# Patient Record
Sex: MALE | Race: WHITE | URBAN - METROPOLITAN AREA
[De-identification: names, ages, dates, MRNs, and addresses within clinical notes are randomized per-mention and may not be internally consistent; named-entity substitution may affect disease eponyms.]

---

## 2018-06-07 ENCOUNTER — OFFICE (AMBULATORY)
Dept: URBAN - METROPOLITAN AREA PATHOLOGY 4 | Facility: PATHOLOGY | Age: 58
End: 2018-06-07
Payer: COMMERCIAL

## 2018-06-07 ENCOUNTER — ON CAMPUS - OUTPATIENT (AMBULATORY)
Dept: URBAN - METROPOLITAN AREA HOSPITAL 2 | Facility: HOSPITAL | Age: 58
End: 2018-06-07
Payer: COMMERCIAL

## 2018-06-07 VITALS
HEIGHT: 70 IN | HEART RATE: 67 BPM | SYSTOLIC BLOOD PRESSURE: 103 MMHG | OXYGEN SATURATION: 99 % | WEIGHT: 165.2 LBS | HEART RATE: 65 BPM | RESPIRATION RATE: 14 BRPM | SYSTOLIC BLOOD PRESSURE: 146 MMHG | DIASTOLIC BLOOD PRESSURE: 72 MMHG | HEART RATE: 63 BPM | DIASTOLIC BLOOD PRESSURE: 78 MMHG | HEART RATE: 64 BPM | SYSTOLIC BLOOD PRESSURE: 112 MMHG | DIASTOLIC BLOOD PRESSURE: 75 MMHG | SYSTOLIC BLOOD PRESSURE: 122 MMHG | SYSTOLIC BLOOD PRESSURE: 107 MMHG | OXYGEN SATURATION: 97 % | DIASTOLIC BLOOD PRESSURE: 68 MMHG | RESPIRATION RATE: 16 BRPM | OXYGEN SATURATION: 95 % | TEMPERATURE: 97.5 F | OXYGEN SATURATION: 98 % | HEART RATE: 77 BPM | SYSTOLIC BLOOD PRESSURE: 109 MMHG | HEART RATE: 69 BPM | DIASTOLIC BLOOD PRESSURE: 83 MMHG | HEART RATE: 66 BPM | DIASTOLIC BLOOD PRESSURE: 69 MMHG | HEART RATE: 72 BPM | SYSTOLIC BLOOD PRESSURE: 131 MMHG | DIASTOLIC BLOOD PRESSURE: 70 MMHG | SYSTOLIC BLOOD PRESSURE: 108 MMHG | SYSTOLIC BLOOD PRESSURE: 105 MMHG | DIASTOLIC BLOOD PRESSURE: 73 MMHG | HEART RATE: 70 BPM

## 2018-06-07 DIAGNOSIS — K63.5 POLYP OF COLON: ICD-10-CM

## 2018-06-07 DIAGNOSIS — K62.89 OTHER SPECIFIED DISEASES OF ANUS AND RECTUM: ICD-10-CM

## 2018-06-07 DIAGNOSIS — D12.4 BENIGN NEOPLASM OF DESCENDING COLON: ICD-10-CM

## 2018-06-07 DIAGNOSIS — K64.0 FIRST DEGREE HEMORRHOIDS: ICD-10-CM

## 2018-06-07 DIAGNOSIS — Z12.11 ENCOUNTER FOR SCREENING FOR MALIGNANT NEOPLASM OF COLON: ICD-10-CM

## 2018-06-07 DIAGNOSIS — D12.8 BENIGN NEOPLASM OF RECTUM: ICD-10-CM

## 2018-06-07 PROBLEM — K62.1 RECTAL POLYP: Status: ACTIVE | Noted: 2018-06-07

## 2018-06-07 PROBLEM — D12.5 BENIGN NEOPLASM OF SIGMOID COLON: Status: ACTIVE | Noted: 2018-06-07

## 2018-06-07 LAB
GI HISTOLOGY: A. UNSPECIFIED: (no result)
GI HISTOLOGY: B. UNSPECIFIED: (no result)
GI HISTOLOGY: C. UNSPECIFIED: (no result)
GI HISTOLOGY: PDF REPORT: (no result)

## 2018-06-07 PROCEDURE — 45385 COLONOSCOPY W/LESION REMOVAL: CPT | Mod: 33 | Performed by: INTERNAL MEDICINE

## 2018-06-07 PROCEDURE — 88305 TISSUE EXAM BY PATHOLOGIST: CPT | Mod: 33 | Performed by: INTERNAL MEDICINE

## 2018-06-07 RX ADMIN — PROPOFOL: 10 INJECTION, EMULSION INTRAVENOUS at 08:53

## 2023-12-06 NOTE — PROGRESS NOTES
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 Sykes for memory.  Patient retired about 1 1/2 ago and it started after that.  Patient is having trouble with dad to day things, like remembering conversations.  His father had Alzheimers. Father living 86. Paternal grandparents had dementia.   No dementia on mother side no dementia ,  of cancer.   Has one child, alive and well two grandchildren    No history of [facility]I,   General anesthesia, colonoscopy, teeth pulled , no major surgery    Not diabetic, no high blood pressure,     Had significant stress working at [employer]     B12, folate and tsh done at pcp office       Maximum   Score Patient's   Score Questions   5 1 \"What is the year?Season?Date?Day of the week?Month?\"   5 2 \"Where are we now: State?County?Town/city?Hospital?Floor?\"   3 3 3 Unrelated objects Number of trials:___   5 0 Count backward from 100 by sevens or spell WORLD backwards   3 0 Name 3 things from above   2 2 Identify 2 objects   1 0 Repeat the phrase: No ifs, ands,or buts.   3 2 Take paper in right hand, fold it in half, and put it on the floor.   1 1 Please read this and do what it says. \"Close your eyes\"   1 0 Make up and write a sentence about anything. Noun and verb   1 0 Copy this picture 10 angles must be present.   30 11 Total MMSE       Family History   Problem Relation Age of Onset    Cancer Mother     Alzheimer's disease Father        History reviewed. No pertinent past medical history.    Social History     Socioeconomic History    Marital status:    Tobacco Use    Smoking status: Every Day     Packs/day: 2.00     Years: 45.00     Additional pack years: 0.00     Total pack years: 90.00     Types: Cigarettes    Smokeless tobacco: Never   Substance and Sexual Activity    Alcohol use: Never    Drug use: Never    Sexual " "activity: Defer         Current Outpatient Medications:     amLODIPine (NORVASC) 10 MG tablet, Take 1 tablet by mouth Every Morning., Disp: , Rfl:     atorvastatin (LIPITOR) 10 MG tablet, Take 1 tablet by mouth Daily., Disp: , Rfl:     escitalopram (LEXAPRO) 5 MG tablet, Take 1 tablet by mouth Daily., Disp: , Rfl:     nebivolol (BYSTOLIC) 20 MG tablet, Take 1 tablet by mouth Daily., Disp: , Rfl:     triamterene-hydrochlorothiazide (MAXZIDE-25) 37.5-25 MG per tablet, Take 1 tablet by mouth Every Morning., Disp: , Rfl:     Review of Systems   Constitutional:  Negative for fatigue.   Respiratory:  Negative for apnea.    Neurological:  Negative for dizziness, tremors, seizures, syncope and headaches.   Psychiatric/Behavioral:  Negative for decreased concentration, hallucinations and sleep disturbance.    All other systems reviewed and are negative.           Objective   Vital Signs:   /69   Pulse 65   Ht 175.3 cm (69\")   Wt 85.3 kg (188 lb)   BMI 27.76 kg/m²     Physical Exam  Vitals reviewed.   HENT:      Nose: Nose normal.   Cardiovascular:      Rate and Rhythm: Normal rate and regular rhythm.   Pulmonary:      Effort: Pulmonary effort is normal. No respiratory distress.      Breath sounds: Normal breath sounds.   Neurological:      General: No focal deficit present.      Mental Status: He is alert and oriented to person, place, and time.   Psychiatric:         Mood and Affect: Mood normal.        Result Review :                Neurologic Exam     Mental Status   Oriented to person, place, and time.              Assessment and Plan    Diagnoses and all orders for this visit:    1. Memory loss (Primary)  -     MRI Brain With & Without Contrast; Future  -     Calcitriol (1,25 di-OH Vitamin D); Future  -     Ceruloplasmin; Future  -     Copper, Serum; Future  -     Vitamin E; Future  -     Vitamin B6; Future  -     T Pallidum Antibody (FTA-Ab); Future  -     Vitamin B1, Whole Blood    2. POLY (obstructive sleep " apnea)      Went to ENT, advanced ent, is going to give him a new machine  Have ordered a sleep study, first study was 2012    Do brain and physical exercise    Start Aricept. 5mg one month  then 10mg daily for one month then 10mg bid    Stop elavil amitriptyline as this can effect memory    Continue cpap       Follow Up   Return in about 1 year (around 12/7/2024).  Patient was given instructions and counseling regarding his condition or for health maintenance advice. Please see specific information pulled into the AVS if appropriate.         This document has been electronically signed by Joseph Seipel, MD on December 7, 2023 16:01 EST

## 2023-12-07 ENCOUNTER — OFFICE VISIT (OUTPATIENT)
Dept: NEUROLOGY | Facility: CLINIC | Age: 63
End: 2023-12-07
Payer: COMMERCIAL

## 2023-12-07 VITALS
SYSTOLIC BLOOD PRESSURE: 108 MMHG | WEIGHT: 188 LBS | DIASTOLIC BLOOD PRESSURE: 69 MMHG | HEART RATE: 65 BPM | BODY MASS INDEX: 27.85 KG/M2 | HEIGHT: 69 IN

## 2023-12-07 DIAGNOSIS — R41.3 MEMORY LOSS: Primary | ICD-10-CM

## 2023-12-07 DIAGNOSIS — G47.33 OSA (OBSTRUCTIVE SLEEP APNEA): ICD-10-CM

## 2023-12-07 PROCEDURE — 99204 OFFICE O/P NEW MOD 45 MIN: CPT | Performed by: PSYCHIATRY & NEUROLOGY

## 2023-12-07 RX ORDER — DONEPEZIL HYDROCHLORIDE 5 MG/1
5 TABLET, FILM COATED ORAL NIGHTLY
Qty: 90 TABLET | Refills: 1 | Status: SHIPPED | OUTPATIENT
Start: 2023-12-07 | End: 2024-12-06

## 2023-12-07 RX ORDER — AMLODIPINE BESYLATE 10 MG/1
10 TABLET ORAL EVERY MORNING
COMMUNITY
Start: 2023-10-04

## 2023-12-07 RX ORDER — NEBIVOLOL 20 MG/1
1 TABLET ORAL DAILY
COMMUNITY
Start: 2023-11-19

## 2023-12-07 RX ORDER — TRIAMTERENE AND HYDROCHLOROTHIAZIDE 37.5; 25 MG/1; MG/1
1 TABLET ORAL EVERY MORNING
COMMUNITY
Start: 2023-10-04

## 2023-12-07 RX ORDER — ATORVASTATIN CALCIUM 10 MG/1
1 TABLET, FILM COATED ORAL DAILY
COMMUNITY
Start: 2023-09-18

## 2023-12-07 RX ORDER — ESCITALOPRAM OXALATE 5 MG/1
1 TABLET ORAL DAILY
COMMUNITY
Start: 2023-11-20

## 2023-12-07 RX ORDER — AMITRIPTYLINE HYDROCHLORIDE 25 MG/1
25 TABLET, FILM COATED ORAL NIGHTLY
COMMUNITY
End: 2023-12-07

## 2023-12-08 ENCOUNTER — LAB (OUTPATIENT)
Dept: LAB | Facility: HOSPITAL | Age: 63
End: 2023-12-08
Payer: COMMERCIAL

## 2023-12-08 DIAGNOSIS — R41.3 MEMORY LOSS: ICD-10-CM

## 2023-12-08 LAB — CERULOPLASMIN SERPL-MCNC: 21 MG/DL (ref 16–31)

## 2023-12-08 PROCEDURE — 82390 ASSAY OF CERULOPLASMIN: CPT

## 2023-12-08 PROCEDURE — 84425 ASSAY OF VITAMIN B-1: CPT | Performed by: PSYCHIATRY & NEUROLOGY

## 2023-12-08 PROCEDURE — 84446 ASSAY OF VITAMIN E: CPT

## 2023-12-08 PROCEDURE — 82525 ASSAY OF COPPER: CPT

## 2023-12-08 PROCEDURE — 84207 ASSAY OF VITAMIN B-6: CPT

## 2023-12-08 PROCEDURE — 82652 VIT D 1 25-DIHYDROXY: CPT

## 2023-12-08 PROCEDURE — 36415 COLL VENOUS BLD VENIPUNCTURE: CPT

## 2023-12-08 PROCEDURE — 86780 TREPONEMA PALLIDUM: CPT

## 2023-12-11 LAB
1,25(OH)2D SERPL-MCNC: 27.3 PG/ML (ref 24.8–81.5)
T PALLIDUM AB SER QL IF: NON REACTIVE

## 2023-12-13 LAB
A-TOCOPHEROL VIT E SERPL-MCNC: 7.1 MG/L (ref 9–29)
GAMMA TOCOPHEROL SERPL-MCNC: 0.9 MG/L (ref 0.5–4.9)
PYRIDOXAL PHOS SERPL-MCNC: 2.9 UG/L (ref 3.4–65.2)
VIT B1 BLD-SCNC: 138.1 NMOL/L (ref 66.5–200)

## 2023-12-16 LAB — COPPER SERPL-MCNC: 106 UG/DL (ref 69–132)

## 2023-12-18 ENCOUNTER — TELEPHONE (OUTPATIENT)
Dept: NEUROLOGY | Facility: CLINIC | Age: 63
End: 2023-12-18
Payer: COMMERCIAL

## 2023-12-18 NOTE — TELEPHONE ENCOUNTER
----- Message from Joseph F Seipel, MD sent at 12/17/2023 12:53 PM EST -----  Labs ok but vitamin e and b6 are mildly low, take otc b6 and vit e for a month or so then take a multivitamin daily

## 2024-02-06 ENCOUNTER — HOSPITAL ENCOUNTER (OUTPATIENT)
Dept: MRI IMAGING | Facility: HOSPITAL | Age: 64
Discharge: HOME | End: 2024-02-06
Admitting: PSYCHIATRY & NEUROLOGY
Payer: COMMERCIAL

## 2024-02-06 DIAGNOSIS — R41.3 MEMORY LOSS: ICD-10-CM

## 2024-02-06 DIAGNOSIS — R90.89 ABNORMAL FINDING ON MRI OF BRAIN: Primary | ICD-10-CM

## 2024-02-06 LAB
CREAT BLDA-MCNC: 1.2 MG/DL (ref 0.6–1.3)
EGFRCR SERPLBLD CKD-EPI 2021: 67.5 ML/MIN/1.73

## 2024-02-06 PROCEDURE — A9579 GAD-BASE MR CONTRAST NOS,1ML: HCPCS | Performed by: PSYCHIATRY & NEUROLOGY

## 2024-02-06 PROCEDURE — 25010000002 GADOTERIDOL PER 1 ML: Performed by: PSYCHIATRY & NEUROLOGY

## 2024-02-06 PROCEDURE — 70553 MRI BRAIN STEM W/O & W/DYE: CPT

## 2024-02-06 PROCEDURE — 82565 ASSAY OF CREATININE: CPT

## 2024-02-06 RX ADMIN — GADOTERIDOL 14 ML: 279.3 INJECTION, SOLUTION INTRAVENOUS at 12:38

## 2024-02-07 ENCOUNTER — TELEPHONE (OUTPATIENT)
Dept: NEUROLOGY | Facility: CLINIC | Age: 64
End: 2024-02-07
Payer: COMMERCIAL

## 2024-02-07 NOTE — TELEPHONE ENCOUNTER
Spoke to patients wife about results and let her know that someone will call to schedule Echo and Doppler

## 2024-02-07 NOTE — TELEPHONE ENCOUNTER
----- Message from Joseph F Seipel, MD sent at 2/6/2024  1:50 PM EST -----  The mri shows ;  .Findings compatible with chronic microvascular ischemic change and diffuse cortical atrophy.  2.Focal encephalomalacia within the left parieto-occipital region.  The area of encephalomalacia is old, indicates an area of previous brain damage, may have been a stroke or due to head injury.  Continue to control blood pressure, and statin medication, take a 81mg asa daily   I will order a carotid doppler and an echo

## 2024-03-08 ENCOUNTER — HOSPITAL ENCOUNTER (OUTPATIENT)
Dept: CARDIOLOGY | Facility: HOSPITAL | Age: 64
Discharge: HOME | End: 2024-03-08
Payer: COMMERCIAL

## 2024-03-08 VITALS
BODY MASS INDEX: 27.85 KG/M2 | DIASTOLIC BLOOD PRESSURE: 69 MMHG | SYSTOLIC BLOOD PRESSURE: 110 MMHG | HEIGHT: 69 IN | WEIGHT: 188 LBS

## 2024-03-08 DIAGNOSIS — R90.89 ABNORMAL FINDING ON MRI OF BRAIN: ICD-10-CM

## 2024-03-08 DIAGNOSIS — R41.3 MEMORY LOSS: ICD-10-CM

## 2024-03-08 LAB
ASCENDING AORTA: 4 CM
BH CV ECHO LEFT VENTRICLE GLOBAL LONGITUDINAL STRAIN: -19.5 %
BH CV ECHO MEAS - AO MAX PG: 5.8 MMHG
BH CV ECHO MEAS - AO MEAN PG: 3 MMHG
BH CV ECHO MEAS - AO V2 MAX: 120 CM/SEC
BH CV ECHO MEAS - AO V2 VTI: 27.9 CM
BH CV ECHO MEAS - AVA(I,D): 2.45 CM2
BH CV ECHO MEAS - EDV(CUBED): 110.6 ML
BH CV ECHO MEAS - EDV(MOD-SP4): 107 ML
BH CV ECHO MEAS - EF(MOD-BP): 53 %
BH CV ECHO MEAS - EF(MOD-SP4): 52.8 %
BH CV ECHO MEAS - ESV(CUBED): 27 ML
BH CV ECHO MEAS - ESV(MOD-SP4): 50.5 ML
BH CV ECHO MEAS - FS: 37.5 %
BH CV ECHO MEAS - IVS/LVPW: 1 CM
BH CV ECHO MEAS - IVSD: 1 CM
BH CV ECHO MEAS - LA DIMENSION: 3.4 CM
BH CV ECHO MEAS - LAT PEAK E' VEL: 7.5 CM/SEC
BH CV ECHO MEAS - LV DIASTOLIC VOL/BSA (35-75): 53.2 CM2
BH CV ECHO MEAS - LV MASS(C)D: 170.2 GRAMS
BH CV ECHO MEAS - LV MAX PG: 3 MMHG
BH CV ECHO MEAS - LV MEAN PG: 1 MMHG
BH CV ECHO MEAS - LV SYSTOLIC VOL/BSA (12-30): 25.1 CM2
BH CV ECHO MEAS - LV V1 MAX: 87 CM/SEC
BH CV ECHO MEAS - LV V1 VTI: 19.7 CM
BH CV ECHO MEAS - LVIDD: 4.8 CM
BH CV ECHO MEAS - LVIDS: 3 CM
BH CV ECHO MEAS - LVOT AREA: 3.5 CM2
BH CV ECHO MEAS - LVOT DIAM: 2.1 CM
BH CV ECHO MEAS - LVPWD: 1 CM
BH CV ECHO MEAS - MED PEAK E' VEL: 7.9 CM/SEC
BH CV ECHO MEAS - MR MAX PG: 140.2 MMHG
BH CV ECHO MEAS - MR MAX VEL: 592 CM/SEC
BH CV ECHO MEAS - MV A MAX VEL: 68.2 CM/SEC
BH CV ECHO MEAS - MV DEC SLOPE: 253 CM/SEC2
BH CV ECHO MEAS - MV DEC TIME: 0.18 SEC
BH CV ECHO MEAS - MV E MAX VEL: 70 CM/SEC
BH CV ECHO MEAS - MV E/A: 1.03
BH CV ECHO MEAS - MV MAX PG: 3.7 MMHG
BH CV ECHO MEAS - MV MEAN PG: 2 MMHG
BH CV ECHO MEAS - MV P1/2T: 112.3 MSEC
BH CV ECHO MEAS - MV V2 VTI: 26.2 CM
BH CV ECHO MEAS - MVA(P1/2T): 1.96 CM2
BH CV ECHO MEAS - MVA(VTI): 2.6 CM2
BH CV ECHO MEAS - PA ACC TIME: 0.15 SEC
BH CV ECHO MEAS - PA V2 MAX: 78.4 CM/SEC
BH CV ECHO MEAS - RV MAX PG: 0.69 MMHG
BH CV ECHO MEAS - RV V1 MAX: 41.5 CM/SEC
BH CV ECHO MEAS - RV V1 VTI: 9.7 CM
BH CV ECHO MEAS - RVDD: 4.1 CM
BH CV ECHO MEAS - SI(MOD-SP4): 28.1 ML/M2
BH CV ECHO MEAS - SV(LVOT): 68.2 ML
BH CV ECHO MEAS - SV(MOD-SP4): 56.5 ML
BH CV ECHO MEAS - TAPSE (>1.6): 2.04 CM
BH CV ECHO MEAS - TR MAX PG: 22.5 MMHG
BH CV ECHO MEAS - TR MAX VEL: 237 CM/SEC
BH CV ECHO MEASUREMENTS AVERAGE E/E' RATIO: 9.09
BH CV ECHO SHUNT ASSESSMENT PERFORMED (HIDDEN SCRIPTING): 1
BH CV XLRA - TDI S': 11.9 CM/SEC
BH CV XLRA MEAS LEFT DIST CCA EDV: 16.7 CM/SEC
BH CV XLRA MEAS LEFT DIST CCA PSV: 46.7 CM/SEC
BH CV XLRA MEAS LEFT DIST ICA EDV: -21.2 CM/SEC
BH CV XLRA MEAS LEFT DIST ICA PSV: -65.1 CM/SEC
BH CV XLRA MEAS LEFT ICA/CCA RATIO: -0.97
BH CV XLRA MEAS LEFT PROX CCA EDV: 17.7 CM/SEC
BH CV XLRA MEAS LEFT PROX CCA PSV: 67.3 CM/SEC
BH CV XLRA MEAS LEFT PROX ECA PSV: -71.3 CM/SEC
BH CV XLRA MEAS LEFT PROX ICA EDV: -16.5 CM/SEC
BH CV XLRA MEAS LEFT PROX ICA PSV: -44.7 CM/SEC
BH CV XLRA MEAS LEFT PROX SCLA PSV: 97.4 CM/SEC
BH CV XLRA MEAS LEFT VERTEBRAL A PSV: -43.5 CM/SEC
BH CV XLRA MEAS RIGHT DIST CCA EDV: 17.4 CM/SEC
BH CV XLRA MEAS RIGHT DIST CCA PSV: 45.4 CM/SEC
BH CV XLRA MEAS RIGHT DIST ICA EDV: -17.2 CM/SEC
BH CV XLRA MEAS RIGHT DIST ICA PSV: -54.1 CM/SEC
BH CV XLRA MEAS RIGHT ICA/CCA RATIO: -0.94
BH CV XLRA MEAS RIGHT PROX CCA EDV: 14.9 CM/SEC
BH CV XLRA MEAS RIGHT PROX CCA PSV: 57.8 CM/SEC
BH CV XLRA MEAS RIGHT PROX ECA PSV: -59 CM/SEC
BH CV XLRA MEAS RIGHT PROX ICA EDV: -12.9 CM/SEC
BH CV XLRA MEAS RIGHT PROX ICA PSV: -41.9 CM/SEC
BH CV XLRA MEAS RIGHT PROX SCLA PSV: 105 CM/SEC
BH CV XLRA MEAS RIGHT VERTEBRAL A PSV: 40.4 CM/SEC
LEFT ATRIUM VOLUME INDEX: 22.9 ML/M2
SINUS: 3.5 CM
STJ: 3.3 CM

## 2024-03-08 PROCEDURE — 93880 EXTRACRANIAL BILAT STUDY: CPT

## 2024-03-08 PROCEDURE — 93306 TTE W/DOPPLER COMPLETE: CPT

## 2024-03-08 PROCEDURE — 93356 MYOCRD STRAIN IMG SPCKL TRCK: CPT

## 2024-04-10 DIAGNOSIS — R41.3 MEMORY LOSS: ICD-10-CM

## 2024-04-10 RX ORDER — DONEPEZIL HYDROCHLORIDE 5 MG/1
5 TABLET, FILM COATED ORAL
Qty: 90 TABLET | Refills: 1 | Status: SHIPPED | OUTPATIENT
Start: 2024-04-10

## 2024-05-17 ENCOUNTER — APPOINTMENT (OUTPATIENT)
Dept: CT IMAGING | Facility: HOSPITAL | Age: 64
End: 2024-05-17
Payer: COMMERCIAL

## 2024-05-17 ENCOUNTER — APPOINTMENT (OUTPATIENT)
Dept: MRI IMAGING | Facility: HOSPITAL | Age: 64
End: 2024-05-17
Payer: COMMERCIAL

## 2024-05-17 ENCOUNTER — APPOINTMENT (OUTPATIENT)
Dept: GENERAL RADIOLOGY | Facility: HOSPITAL | Age: 64
End: 2024-05-17
Payer: COMMERCIAL

## 2024-05-17 ENCOUNTER — HOSPITAL ENCOUNTER (OUTPATIENT)
Facility: HOSPITAL | Age: 64
Setting detail: OBSERVATION
Discharge: HOME | End: 2024-05-19
Attending: EMERGENCY MEDICINE | Admitting: HOSPITALIST
Payer: COMMERCIAL

## 2024-05-17 ENCOUNTER — TELEPHONE (OUTPATIENT)
Dept: NEUROLOGY | Facility: CLINIC | Age: 64
End: 2024-05-17
Payer: COMMERCIAL

## 2024-05-17 DIAGNOSIS — R93.1 ABNORMAL ECHOCARDIOGRAM: ICD-10-CM

## 2024-05-17 DIAGNOSIS — R90.89 ABNORMAL FINDING ON MRI OF BRAIN: Primary | ICD-10-CM

## 2024-05-17 DIAGNOSIS — R41.0 CONFUSION: Primary | ICD-10-CM

## 2024-05-17 PROBLEM — R47.01 APHASIA: Status: ACTIVE | Noted: 2024-05-17

## 2024-05-17 LAB
ABO GROUP BLD: NORMAL
ALBUMIN SERPL-MCNC: 4.4 G/DL (ref 3.5–5.2)
ALBUMIN/GLOB SERPL: 1.6 G/DL
ALP SERPL-CCNC: 111 U/L (ref 39–117)
ALT SERPL W P-5'-P-CCNC: 22 U/L (ref 1–41)
ANION GAP SERPL CALCULATED.3IONS-SCNC: 12 MMOL/L (ref 5–15)
APTT PPP: 30.3 SECONDS (ref 24–31)
AST SERPL-CCNC: 18 U/L (ref 1–40)
BASOPHILS # BLD AUTO: 0.07 10*3/MM3 (ref 0–0.2)
BASOPHILS NFR BLD AUTO: 0.9 % (ref 0–1.5)
BILIRUB SERPL-MCNC: 0.6 MG/DL (ref 0–1.2)
BLD GP AB SCN SERPL QL: NEGATIVE
BUN SERPL-MCNC: 16 MG/DL (ref 8–23)
BUN/CREAT SERPL: 16.5 (ref 7–25)
CALCIUM SPEC-SCNC: 9.1 MG/DL (ref 8.6–10.5)
CHLORIDE SERPL-SCNC: 106 MMOL/L (ref 98–107)
CHOLEST SERPL-MCNC: 106 MG/DL (ref 0–200)
CO2 SERPL-SCNC: 25 MMOL/L (ref 22–29)
CREAT SERPL-MCNC: 0.97 MG/DL (ref 0.76–1.27)
DEPRECATED RDW RBC AUTO: 43.8 FL (ref 37–54)
EGFRCR SERPLBLD CKD-EPI 2021: 87.2 ML/MIN/1.73
EOSINOPHIL # BLD AUTO: 0.28 10*3/MM3 (ref 0–0.4)
EOSINOPHIL NFR BLD AUTO: 3.5 % (ref 0.3–6.2)
ERYTHROCYTE [DISTWIDTH] IN BLOOD BY AUTOMATED COUNT: 12.1 % (ref 12.3–15.4)
GLOBULIN UR ELPH-MCNC: 2.8 GM/DL
GLUCOSE BLDC GLUCOMTR-MCNC: 120 MG/DL (ref 70–105)
GLUCOSE SERPL-MCNC: 112 MG/DL (ref 65–99)
HBA1C MFR BLD: 6.3 % (ref 4.8–5.6)
HCT VFR BLD AUTO: 51.1 % (ref 37.5–51)
HDLC SERPL-MCNC: 33 MG/DL (ref 40–60)
HGB BLD-MCNC: 17.9 G/DL (ref 13–17.7)
HOLD SPECIMEN: NORMAL
HOLD SPECIMEN: NORMAL
IMM GRANULOCYTES # BLD AUTO: 0.01 10*3/MM3 (ref 0–0.05)
IMM GRANULOCYTES NFR BLD AUTO: 0.1 % (ref 0–0.5)
INR PPP: 1 (ref 0.93–1.1)
LDLC SERPL CALC-MCNC: 41 MG/DL (ref 0–100)
LDLC/HDLC SERPL: 1.02 {RATIO}
LYMPHOCYTES # BLD AUTO: 2.61 10*3/MM3 (ref 0.7–3.1)
LYMPHOCYTES NFR BLD AUTO: 32.7 % (ref 19.6–45.3)
MCH RBC QN AUTO: 34 PG (ref 26.6–33)
MCHC RBC AUTO-ENTMCNC: 35 G/DL (ref 31.5–35.7)
MCV RBC AUTO: 97.1 FL (ref 79–97)
MONOCYTES # BLD AUTO: 0.68 10*3/MM3 (ref 0.1–0.9)
MONOCYTES NFR BLD AUTO: 8.5 % (ref 5–12)
NEUTROPHILS NFR BLD AUTO: 4.34 10*3/MM3 (ref 1.7–7)
NEUTROPHILS NFR BLD AUTO: 54.3 % (ref 42.7–76)
NRBC BLD AUTO-RTO: 0 /100 WBC (ref 0–0.2)
PLATELET # BLD AUTO: 223 10*3/MM3 (ref 140–450)
PMV BLD AUTO: 10.6 FL (ref 6–12)
POTASSIUM SERPL-SCNC: 3.7 MMOL/L (ref 3.5–5.2)
PROT SERPL-MCNC: 7.2 G/DL (ref 6–8.5)
PROTHROMBIN TIME: 10.9 SECONDS (ref 9.6–11.7)
RBC # BLD AUTO: 5.26 10*6/MM3 (ref 4.14–5.8)
RH BLD: POSITIVE
SODIUM SERPL-SCNC: 143 MMOL/L (ref 136–145)
T&S EXPIRATION DATE: NORMAL
TRIGL SERPL-MCNC: 196 MG/DL (ref 0–150)
TROPONIN T SERPL HS-MCNC: <6 NG/L
TSH SERPL DL<=0.05 MIU/L-ACNC: 1.52 UIU/ML (ref 0.27–4.2)
VLDLC SERPL-MCNC: 32 MG/DL (ref 5–40)
WBC NRBC COR # BLD AUTO: 7.99 10*3/MM3 (ref 3.4–10.8)
WHOLE BLOOD HOLD COAG: NORMAL
WHOLE BLOOD HOLD SPECIMEN: NORMAL

## 2024-05-17 PROCEDURE — G0378 HOSPITAL OBSERVATION PER HR: HCPCS

## 2024-05-17 PROCEDURE — 25810000003 SODIUM CHLORIDE 0.9 % SOLUTION: Performed by: EMERGENCY MEDICINE

## 2024-05-17 PROCEDURE — 25510000001 IOPAMIDOL PER 1 ML: Performed by: EMERGENCY MEDICINE

## 2024-05-17 PROCEDURE — 93005 ELECTROCARDIOGRAM TRACING: CPT | Performed by: EMERGENCY MEDICINE

## 2024-05-17 PROCEDURE — 99285 EMERGENCY DEPT VISIT HI MDM: CPT

## 2024-05-17 PROCEDURE — 84484 ASSAY OF TROPONIN QUANT: CPT | Performed by: EMERGENCY MEDICINE

## 2024-05-17 PROCEDURE — 70450 CT HEAD/BRAIN W/O DYE: CPT

## 2024-05-17 PROCEDURE — 70498 CT ANGIOGRAPHY NECK: CPT

## 2024-05-17 PROCEDURE — 86850 RBC ANTIBODY SCREEN: CPT | Performed by: EMERGENCY MEDICINE

## 2024-05-17 PROCEDURE — 86900 BLOOD TYPING SEROLOGIC ABO: CPT

## 2024-05-17 PROCEDURE — 84443 ASSAY THYROID STIM HORMONE: CPT | Performed by: INTERNAL MEDICINE

## 2024-05-17 PROCEDURE — 85730 THROMBOPLASTIN TIME PARTIAL: CPT | Performed by: EMERGENCY MEDICINE

## 2024-05-17 PROCEDURE — 71045 X-RAY EXAM CHEST 1 VIEW: CPT

## 2024-05-17 PROCEDURE — 82948 REAGENT STRIP/BLOOD GLUCOSE: CPT

## 2024-05-17 PROCEDURE — 80061 LIPID PANEL: CPT | Performed by: INTERNAL MEDICINE

## 2024-05-17 PROCEDURE — 83036 HEMOGLOBIN GLYCOSYLATED A1C: CPT | Performed by: INTERNAL MEDICINE

## 2024-05-17 PROCEDURE — 96372 THER/PROPH/DIAG INJ SC/IM: CPT

## 2024-05-17 PROCEDURE — 86901 BLOOD TYPING SEROLOGIC RH(D): CPT

## 2024-05-17 PROCEDURE — 86900 BLOOD TYPING SEROLOGIC ABO: CPT | Performed by: EMERGENCY MEDICINE

## 2024-05-17 PROCEDURE — 70551 MRI BRAIN STEM W/O DYE: CPT

## 2024-05-17 PROCEDURE — 85610 PROTHROMBIN TIME: CPT | Performed by: EMERGENCY MEDICINE

## 2024-05-17 PROCEDURE — 25010000002 OLANZAPINE 10 MG RECONSTITUTED SOLUTION: Performed by: INTERNAL MEDICINE

## 2024-05-17 PROCEDURE — 99214 OFFICE O/P EST MOD 30 MIN: CPT | Performed by: STUDENT IN AN ORGANIZED HEALTH CARE EDUCATION/TRAINING PROGRAM

## 2024-05-17 PROCEDURE — 70496 CT ANGIOGRAPHY HEAD: CPT

## 2024-05-17 PROCEDURE — 85025 COMPLETE CBC W/AUTO DIFF WBC: CPT | Performed by: EMERGENCY MEDICINE

## 2024-05-17 PROCEDURE — 36415 COLL VENOUS BLD VENIPUNCTURE: CPT

## 2024-05-17 PROCEDURE — 86901 BLOOD TYPING SEROLOGIC RH(D): CPT | Performed by: EMERGENCY MEDICINE

## 2024-05-17 PROCEDURE — 80053 COMPREHEN METABOLIC PANEL: CPT | Performed by: EMERGENCY MEDICINE

## 2024-05-17 RX ORDER — SODIUM CHLORIDE 9 MG/ML
40 INJECTION, SOLUTION INTRAVENOUS AS NEEDED
Status: DISCONTINUED | OUTPATIENT
Start: 2024-05-17 | End: 2024-05-19 | Stop reason: HOSPADM

## 2024-05-17 RX ORDER — NITROGLYCERIN 0.4 MG/1
0.4 TABLET SUBLINGUAL
Status: DISCONTINUED | OUTPATIENT
Start: 2024-05-17 | End: 2024-05-19 | Stop reason: HOSPADM

## 2024-05-17 RX ORDER — UREA 10 %
5 LOTION (ML) TOPICAL NIGHTLY PRN
Status: DISCONTINUED | OUTPATIENT
Start: 2024-05-17 | End: 2024-05-19 | Stop reason: HOSPADM

## 2024-05-17 RX ORDER — OLANZAPINE 10 MG/2ML
5 INJECTION, POWDER, FOR SOLUTION INTRAMUSCULAR ONCE
Status: DISCONTINUED | OUTPATIENT
Start: 2024-05-17 | End: 2024-05-17

## 2024-05-17 RX ORDER — DONEPEZIL HYDROCHLORIDE 5 MG/1
5 TABLET, FILM COATED ORAL NIGHTLY
Status: DISCONTINUED | OUTPATIENT
Start: 2024-05-17 | End: 2024-05-19 | Stop reason: HOSPADM

## 2024-05-17 RX ORDER — MULTIPLE VITAMINS W/ MINERALS TAB 9MG-400MCG
1 TAB ORAL DAILY
COMMUNITY

## 2024-05-17 RX ORDER — AMLODIPINE BESYLATE 5 MG/1
10 TABLET ORAL EVERY MORNING
Status: DISCONTINUED | OUTPATIENT
Start: 2024-05-18 | End: 2024-05-19 | Stop reason: HOSPADM

## 2024-05-17 RX ORDER — ATORVASTATIN CALCIUM 40 MG/1
40 TABLET, FILM COATED ORAL NIGHTLY
Status: DISCONTINUED | OUTPATIENT
Start: 2024-05-17 | End: 2024-05-19 | Stop reason: HOSPADM

## 2024-05-17 RX ORDER — SODIUM CHLORIDE 0.9 % (FLUSH) 0.9 %
10 SYRINGE (ML) INJECTION EVERY 12 HOURS SCHEDULED
Status: DISCONTINUED | OUTPATIENT
Start: 2024-05-17 | End: 2024-05-19 | Stop reason: HOSPADM

## 2024-05-17 RX ORDER — ONDANSETRON 2 MG/ML
4 INJECTION INTRAMUSCULAR; INTRAVENOUS EVERY 6 HOURS PRN
Status: DISCONTINUED | OUTPATIENT
Start: 2024-05-17 | End: 2024-05-19 | Stop reason: HOSPADM

## 2024-05-17 RX ORDER — SODIUM CHLORIDE 0.9 % (FLUSH) 0.9 %
10 SYRINGE (ML) INJECTION AS NEEDED
Status: DISCONTINUED | OUTPATIENT
Start: 2024-05-17 | End: 2024-05-19 | Stop reason: HOSPADM

## 2024-05-17 RX ORDER — ASPIRIN 325 MG
325 TABLET ORAL ONCE
Status: DISCONTINUED | OUTPATIENT
Start: 2024-05-17 | End: 2024-05-17

## 2024-05-17 RX ORDER — ATORVASTATIN CALCIUM 10 MG/1
10 TABLET, FILM COATED ORAL NIGHTLY
Status: DISCONTINUED | OUTPATIENT
Start: 2024-05-17 | End: 2024-05-17

## 2024-05-17 RX ORDER — ALUMINA, MAGNESIA, AND SIMETHICONE 2400; 2400; 240 MG/30ML; MG/30ML; MG/30ML
15 SUSPENSION ORAL EVERY 6 HOURS PRN
Status: DISCONTINUED | OUTPATIENT
Start: 2024-05-17 | End: 2024-05-19 | Stop reason: HOSPADM

## 2024-05-17 RX ORDER — BISACODYL 10 MG
10 SUPPOSITORY, RECTAL RECTAL DAILY PRN
Status: DISCONTINUED | OUTPATIENT
Start: 2024-05-17 | End: 2024-05-19 | Stop reason: HOSPADM

## 2024-05-17 RX ORDER — ACETAMINOPHEN 325 MG/1
650 TABLET ORAL EVERY 4 HOURS PRN
Status: DISCONTINUED | OUTPATIENT
Start: 2024-05-17 | End: 2024-05-19 | Stop reason: HOSPADM

## 2024-05-17 RX ORDER — OLANZAPINE 10 MG/2ML
5 INJECTION, POWDER, FOR SOLUTION INTRAMUSCULAR ONCE AS NEEDED
Status: COMPLETED | OUTPATIENT
Start: 2024-05-17 | End: 2024-05-17

## 2024-05-17 RX ORDER — ASPIRIN 300 MG/1
300 SUPPOSITORY RECTAL ONCE
Status: DISCONTINUED | OUTPATIENT
Start: 2024-05-17 | End: 2024-05-17

## 2024-05-17 RX ORDER — ASPIRIN 81 MG/1
81 TABLET ORAL DAILY
Status: DISCONTINUED | OUTPATIENT
Start: 2024-05-17 | End: 2024-05-17

## 2024-05-17 RX ORDER — NEBIVOLOL 10 MG/1
20 TABLET ORAL NIGHTLY
Status: DISCONTINUED | OUTPATIENT
Start: 2024-05-17 | End: 2024-05-19 | Stop reason: HOSPADM

## 2024-05-17 RX ORDER — CLOPIDOGREL BISULFATE 75 MG/1
75 TABLET ORAL DAILY
Status: DISCONTINUED | OUTPATIENT
Start: 2024-05-17 | End: 2024-05-19 | Stop reason: HOSPADM

## 2024-05-17 RX ORDER — AMOXICILLIN 250 MG
2 CAPSULE ORAL 2 TIMES DAILY PRN
Status: DISCONTINUED | OUTPATIENT
Start: 2024-05-17 | End: 2024-05-19 | Stop reason: HOSPADM

## 2024-05-17 RX ORDER — ASPIRIN 81 MG/1
81 TABLET ORAL DAILY
Status: DISCONTINUED | OUTPATIENT
Start: 2024-05-18 | End: 2024-05-19 | Stop reason: HOSPADM

## 2024-05-17 RX ORDER — ONDANSETRON 4 MG/1
4 TABLET, ORALLY DISINTEGRATING ORAL EVERY 6 HOURS PRN
Status: DISCONTINUED | OUTPATIENT
Start: 2024-05-17 | End: 2024-05-19 | Stop reason: HOSPADM

## 2024-05-17 RX ORDER — BISACODYL 5 MG/1
5 TABLET, DELAYED RELEASE ORAL DAILY PRN
Status: DISCONTINUED | OUTPATIENT
Start: 2024-05-17 | End: 2024-05-19 | Stop reason: HOSPADM

## 2024-05-17 RX ORDER — POLYETHYLENE GLYCOL 3350 17 G/17G
17 POWDER, FOR SOLUTION ORAL DAILY PRN
Status: DISCONTINUED | OUTPATIENT
Start: 2024-05-17 | End: 2024-05-19 | Stop reason: HOSPADM

## 2024-05-17 RX ORDER — ASPIRIN 81 MG/1
81 TABLET ORAL DAILY
COMMUNITY

## 2024-05-17 RX ADMIN — DONEPEZIL HYDROCHLORIDE 5 MG: 5 TABLET, FILM COATED ORAL at 22:20

## 2024-05-17 RX ADMIN — ATORVASTATIN CALCIUM 40 MG: 40 TABLET, FILM COATED ORAL at 22:20

## 2024-05-17 RX ADMIN — Medication 10 ML: at 22:20

## 2024-05-17 RX ADMIN — SODIUM CHLORIDE 500 ML: 9 INJECTION, SOLUTION INTRAVENOUS at 16:51

## 2024-05-17 RX ADMIN — IOPAMIDOL 100 ML: 755 INJECTION, SOLUTION INTRAVENOUS at 16:31

## 2024-05-17 RX ADMIN — OLANZAPINE 5 MG: 10 INJECTION, POWDER, FOR SOLUTION INTRAMUSCULAR at 18:58

## 2024-05-17 NOTE — H&P
History and Physical   Navid Stearns : 1960 MRN:[id number] LOS:0     Reason for admission: Aphasia     Assessment / Plan     #Aphasia along with altered mental status, CVA to exclude  -Presented with dizziness altered mental status aphasia around 2 PM on May 17, 2024.  Lasted for 30 mins   -NIHSS 1 in the ED. He is back to baseline.   -CT head and CT angio head and neck without any acute changes.  -MRI brain and echo  -Neurology is consulted.  -ASA and statin   -Fall precaution seizure precaution PT OT CM    #Aortic issue ( ? Aneurysm )  -pt family mentioned there is large aorta and seems has to be managed asap, they got cardio appt   -they requested neurologist to consult cardio   -nowhere to find the result as of now   -will get CT angio chest and if needed will get CTS    #Hypertension  -Resume home medication once appropriate.  Given CVA exclusion, permissive hypertension as of now.    # Hyperlipidemia  -On statin.  Lipid panel    #Mild dementia  -Frequent reorientation          Nutrition: NPO Diet NPO Type: Strict NPO     DVT Prophylaxis:   Mechanical Order History:        Ordered        24 1723  Place Sequential Compression Device  Once            24 1723  Maintain Sequential Compression Device  Continuous                          Pharmalogical Order History:       None             History of Present illness     A 64 y.o. old male patient with PMH of hypertension hyperlipidemia CVA mild dementia presents to the hospital with complaints of altered mental status with aphasia around 2 PM today. Dizziness present. Lasting for 30 mins only. NIHSS 1 in the ED. CT head CT angio head and neck without any acute changes.  MRI brain pending.  Echo to be done.  Neurology consulted. Another issue is he is recently getting told that he got the large aorta needed cardio attention. Not sure the result. There is no result in epic as well. Cardio is consulted.     Admitted for CVA and aorta issue  workup.     Subjective / Review of systems     Review of Systems   Feeling fine     Past Medical/Surgical/Social/Family History & Allergies   No past medical history on file.   No past surgical history on file.   Social History     Socioeconomic History    Marital status:    Tobacco Use    Smoking status: Every Day     Current packs/day: 2.00     Average packs/day: 2.0 packs/day for 45.0 years (90.0 ttl pk-yrs)     Types: Cigarettes    Smokeless tobacco: Never   Substance and Sexual Activity    Alcohol use: Never    Drug use: Never    Sexual activity: Defer      Family History   Problem Relation Age of Onset    Cancer Mother     Alzheimer's disease Father       Allergies   Allergen Reactions    Penicillins Unknown - High Severity        Home Medications     Prior to Admission medications    Medication Sig Start Date End Date Taking? Authorizing Provider   amLODIPine (NORVASC) 10 MG tablet Take 1 tablet by mouth Every Morning. 10/4/23  Yes Nicolás Mcconnell MD   aspirin 81 MG EC tablet Take 1 tablet by mouth Daily.   Yes Nicolás Mcconnell MD   atorvastatin (LIPITOR) 10 MG tablet Take 1 tablet by mouth Every Night. 9/18/23  Yes Nicolás Mcconnell MD   donepezil (ARICEPT) 5 MG tablet TAKE 1 TABLET BY MOUTH EVERY DAY AT NIGHT 4/10/24  Yes Seipel, Joseph F, MD   escitalopram (LEXAPRO) 5 MG tablet Take 1 tablet by mouth Every Night. 11/20/23  Yes Nicolás Mcconnell MD   multivitamin with minerals tablet tablet Take 1 tablet by mouth Daily.   Yes Nicolás Mcconnell MD   nebivolol (BYSTOLIC) 20 MG tablet Take 1 tablet by mouth Every Night. 11/19/23  Yes Nicolás Mcconnell MD   triamterene-hydrochlorothiazide (MAXZIDE-25) 37.5-25 MG per tablet Take 1 tablet by mouth Every Morning. 10/4/23 5/17/24  Nicolás Mcconnell MD      Objective / Physical Exam   Vital signs:  Temp: 98.6 °F (37 °C)  BP: 143/68  Heart Rate: 63  Resp: 17  SpO2: 97 %  Weight: 75.8 kg (167 lb)    Admission Weight: Weight: 75.8  kg (167 lb)    Physical Exam   Physical Exam  HENT:      Head: Normocephalic and atraumatic.      Nose: Nose normal.   Eyes:      Extraocular Movements: Extraocular movements intact.      Conjunctivae/sclera: Conjunctivae normal.      Pupils: Pupils are equal, round, and reactive to light.   Cardiovascular:      Rate and Rhythm: normal       Pulses: Normal pulses.      Heart sounds: Normal heart sounds.   Pulmonary:      Effort: normal      Breath sounds: normal   Abdominal:      General: Abdomen is flat. Bowel sounds are normal.      Palpations: Abdomen is soft.   Musculoskeletal:         General: Normal range of motion.      Cervical back: Normal range of motion and neck supple.   Skin:     General: Skin is dry.   Neurological:      General: No focal deficit present.      Mental Status: alert.   Psychiatric:         Mood and Affect: Mood normal.        Labs     Results from last 7 days   Lab Units 05/17/24  1616   WBC 10*3/mm3 7.99   HEMATOCRIT % 51.1*   PLATELETS 10*3/mm3 223      Results from last 7 days   Lab Units 05/17/24  1616   SODIUM mmol/L 143   POTASSIUM mmol/L 3.7   CHLORIDE mmol/L 106   CO2 mmol/L 25.0   BUN mg/dL 16   CREATININE mg/dL 0.97        Current Medications   Scheduled Meds:[Held by provider] amLODIPine, 10 mg, Oral, QAM  aspirin, 81 mg, Oral, Daily  atorvastatin, 10 mg, Oral, Nightly  donepezil, 5 mg, Oral, Nightly  [Held by provider] nebivolol, 20 mg, Oral, Nightly  sodium chloride, 10 mL, Intravenous, Q12H         Continuous Infusions:      Jag Mosher MD  [facility]   05/17/24   17:25 EDT

## 2024-05-17 NOTE — CASE MANAGEMENT/SOCIAL WORK
Discharge Planning Assessment   Oscar     Patient Name: Navid Stearns  MRN: [id number]  Today's Date: 5/17/2024    Admit Date: 5/17/2024    Plan: From home, PT/OT pending   Discharge Needs Assessment       Row Name 05/17/24 1929       Living Environment    People in Home spouse    Current Living Arrangements home    Potentially Unsafe Housing Conditions none    In the past 12 months has the electric, gas, oil, or water company threatened to shut off services in your home? No    Primary Care Provided by self    Provides Primary Care For no one    Family Caregiver if Needed spouse    Family Caregiver Names Spouse Rosalind    Quality of Family Relationships helpful;involved;supportive    Able to Return to Prior Arrangements yes       Resource/Environmental Concerns    Resource/Environmental Concerns none    Transportation Concerns none       Transportation Needs    In the past 12 months, has lack of transportation kept you from medical appointments or from getting medications? no    In the past 12 months, has lack of transportation kept you from meetings, work, or from getting things needed for daily living? No       Food Insecurity    Within the past 12 months, you worried that your food would run out before you got the money to buy more. Never true    Within the past 12 months, the food you bought just didn't last and you didn't have money to get more. Never true       Transition Planning    Patient/Family Anticipates Transition to home with family    Patient/Family Anticipated Services at Transition none    Transportation Anticipated family or friend will provide  spouse Rosalind can transport at d/c.       Discharge Needs Assessment    Readmission Within the Last 30 Days no previous admission in last 30 days    Equipment Currently Used at Home cpap;bp cuff;other (see comments)  CPAP per [facility] [facility], Sat monitor    Concerns to be Addressed no discharge needs identified                   Discharge Plan       Yesica  Name 05/17/24 1935       Plan    Plan From home, PT/OT pending    Patient/Family in Agreement with Plan yes    Provided Post Acute Provider List? N/A    Provided Post Acute Provider Quality & Resource List? N/A    Plan Comments CM spoke with pt. and spouse Rosalind at bedside. PCP and pharmacy confirmed. pt. denies financial, transportation, or medication issues. pt declined M2B. Pt.'s spouse Rosalind can transport at d/c. DC Barriers: PT/OT pending, Neurology and Cardiology consult.                Demographic Summary       Row Name 05/17/24 1928       General Information    Admission Type inpatient    Referral Source admission list    Reason for Consult discharge planning       Contact Information    Permission Granted to Share Info With     Contact Information Obtained for                    Functional Status       Row Name 05/17/24 1929       Functional Status    Usual Activity Tolerance excellent    Current Activity Tolerance good       Physical Activity    On average, how many days per week do you engage in moderate to strenuous exercise (like a brisk walk)? 7 days    On average, how many minutes do you engage in exercise at this level? 150+ min    Number of minutes of exercise per week 1050       Functional Status, IADL    Medications independent    Meal Preparation independent    Housekeeping independent    Shopping independent                     Belle Trevizo RN    Office: [phone]  Fax: [phone]  [email or web address]

## 2024-05-17 NOTE — ED PROVIDER NOTES
"Subjective   History of Present Illness  64-year-old male was with his wife \"running errands \"and around 2 PM wife noticed that he seemed to be somewhat confused he was having some difficulty speaking and was describing being dizzy.  She states this lasted about 30 minutes.  She states upon arrival here wife and  both state that he seems to be back to his baseline.  He denies any focal numbness or weakness or headache or blurry vision.  Review of Systems    No past medical history on file.  Hypertension, hypercholesterolemia, stroke seen on neuroimaging 1 year ago, mild dementia  Allergies   Allergen Reactions    Penicillins Unknown - High Severity       No past surgical history on file.    Family History   Problem Relation Age of Onset    Cancer Mother     Alzheimer's disease Father        Social History     Socioeconomic History    Marital status:    Tobacco Use    Smoking status: Every Day     Current packs/day: 2.00     Average packs/day: 2.0 packs/day for 45.0 years (90.0 ttl pk-yrs)     Types: Cigarettes    Smokeless tobacco: Never   Substance and Sexual Activity    Alcohol use: Never    Drug use: Never    Sexual activity: Defer     Prior to Admission medications    Medication Sig Start Date End Date Taking? Authorizing Provider   amLODIPine (NORVASC) 10 MG tablet Take 1 tablet by mouth Every Morning. 10/4/23   Nicolás Mcconnell MD   atorvastatin (LIPITOR) 10 MG tablet Take 1 tablet by mouth Daily. 9/18/23   Nicolás Mcconnell MD   donepezil (ARICEPT) 5 MG tablet TAKE 1 TABLET BY MOUTH EVERY DAY AT NIGHT 4/10/24   Seipel, Joseph F, MD   escitalopram (LEXAPRO) 5 MG tablet Take 1 tablet by mouth Daily. 11/20/23   Nicolás Mcconnell MD   nebivolol (BYSTOLIC) 20 MG tablet Take 1 tablet by mouth Daily. 11/19/23   Nicolás Mcconnell MD   triamterene-hydrochlorothiazide (MAXZIDE-25) 37.5-25 MG per tablet Take 1 tablet by mouth Every Morning. 10/4/23   Nicolás Mcconnell MD     /68 " "(BP Location: Right arm, Patient Position: Sitting)   Pulse 63   Temp 98.6 °F (37 °C) (Oral)   Resp 17   Ht 175.3 cm (69\")   Wt 75.8 kg (167 lb)   SpO2 97%   BMI 24.66 kg/m²         Objective   Physical Exam  General: Well-developed well-appearing, no acute distress, alert and appropriate  Eyes: Pupils round and equal, sclera nonicteric  HEENT: Mucous membranes moist, no mucosal swelling  Neck: Supple, no nuchal rigidity, no JVD  Respirations: Respirations nonlabored, equal breath sounds bilaterally, clear lungs  Heart regular rate and rhythm, no murmurs rubs or gallops,   Abdomen soft nontender nondistended, no hepatosplenomegaly,   Extremities no clubbing cyanosis or edema,   Neuro cranial nerves II through XII intact , normal sensory/motor function and strength in four extremities, no slurred speech, no facial droop, normal finger to nose, normal gait and station, no ataxia  Psych oriented, pleasant affect  Skin no rash, brisk cap refill  Procedures           ED Course      Results for orders placed or performed during the hospital encounter of 05/17/24   Comprehensive Metabolic Panel    Specimen: Blood   Result Value Ref Range    Glucose 112 (H) 65 - 99 mg/dL    BUN 16 8 - 23 mg/dL    Creatinine 0.97 0.76 - 1.27 mg/dL    Sodium 143 136 - 145 mmol/L    Potassium 3.7 3.5 - 5.2 mmol/L    Chloride 106 98 - 107 mmol/L    CO2 25.0 22.0 - 29.0 mmol/L    Calcium 9.1 8.6 - 10.5 mg/dL    Total Protein 7.2 6.0 - 8.5 g/dL    Albumin 4.4 3.5 - 5.2 g/dL    ALT (SGPT) 22 1 - 41 U/L    AST (SGOT) 18 1 - 40 U/L    Alkaline Phosphatase 111 39 - 117 U/L    Total Bilirubin 0.6 0.0 - 1.2 mg/dL    Globulin 2.8 gm/dL    A/G Ratio 1.6 g/dL    BUN/Creatinine Ratio 16.5 7.0 - 25.0    Anion Gap 12.0 5.0 - 15.0 mmol/L    eGFR 87.2 >60.0 mL/min/1.73   Protime-INR    Specimen: Blood   Result Value Ref Range    Protime 10.9 9.6 - 11.7 Seconds    INR 1.00 0.93 - 1.10   aPTT    Specimen: Blood   Result Value Ref Range    PTT 30.3 24.0 - " 31.0 seconds   Single High Sensitivity Troponin T    Specimen: Blood   Result Value Ref Range    HS Troponin T <6 <22 ng/L   CBC Auto Differential    Specimen: Blood   Result Value Ref Range    WBC 7.99 3.40 - 10.80 10*3/mm3    RBC 5.26 4.14 - 5.80 10*6/mm3    Hemoglobin 17.9 (H) 13.0 - 17.7 g/dL    Hematocrit 51.1 (H) 37.5 - 51.0 %    MCV 97.1 (H) 79.0 - 97.0 fL    MCH 34.0 (H) 26.6 - 33.0 pg    MCHC 35.0 31.5 - 35.7 g/dL    RDW 12.1 (L) 12.3 - 15.4 %    RDW-SD 43.8 37.0 - 54.0 fl    MPV 10.6 6.0 - 12.0 fL    Platelets 223 140 - 450 10*3/mm3    Neutrophil % 54.3 42.7 - 76.0 %    Lymphocyte % 32.7 19.6 - 45.3 %    Monocyte % 8.5 5.0 - 12.0 %    Eosinophil % 3.5 0.3 - 6.2 %    Basophil % 0.9 0.0 - 1.5 %    Immature Grans % 0.1 0.0 - 0.5 %    Neutrophils, Absolute 4.34 1.70 - 7.00 10*3/mm3    Lymphocytes, Absolute 2.61 0.70 - 3.10 10*3/mm3    Monocytes, Absolute 0.68 0.10 - 0.90 10*3/mm3    Eosinophils, Absolute 0.28 0.00 - 0.40 10*3/mm3    Basophils, Absolute 0.07 0.00 - 0.20 10*3/mm3    Immature Grans, Absolute 0.01 0.00 - 0.05 10*3/mm3    nRBC 0.0 0.0 - 0.2 /100 WBC   POC Glucose Once    Specimen: Blood   Result Value Ref Range    Glucose 120 (H) 70 - 105 mg/dL   ECG 12 Lead Stroke Evaluation   Result Value Ref Range    QT Interval 418 ms    QTC Interval 426 ms   Green Top (Gel)   Result Value Ref Range    Extra Tube Hold for add-ons.    Lavender Top   Result Value Ref Range    Extra Tube hold for add-on    Gold Top - SST   Result Value Ref Range    Extra Tube Hold for add-ons.    Light Blue Top   Result Value Ref Range    Extra Tube Hold for add-ons.      XR Chest 1 View    Result Date: 5/17/2024  Impression: No acute cardiopulmonary abnormality. Electronically Signed: Jonathan Bonner MD  5/17/2024 5:13 PM EDT  Workstation ID: [id number]    CT Angiogram Head w AI Analysis of LVO    Result Date: 5/17/2024  Mild atheromatous disease. No vessel occlusion or severe stenosis. Electronically Signed: Tyrone Blank MD   5/17/2024 4:44 PM EDT  Workstation ID: [id number]    CT Angiogram Neck    Result Date: 5/17/2024  Mild atheromatous disease. No vessel occlusion or severe stenosis. Electronically Signed: Tyrone Blank MD  5/17/2024 4:44 PM EDT  Workstation ID: [id number]    CT Head Without Contrast Stroke Protocol    Result Date: 5/17/2024  1. No acute intracranial hemorrhage or mass effect 2. Remote infarct, area of encephalomalacia again noted left parietal occipital region Electronically Signed: Lamont Turk MD  5/17/2024 4:25 PM EDT  Workstation ID: [id number]                       Total (NIH Stroke Scale): 1                  Medical Decision Making  Code stroke was initiated upon patient's initial evaluation brought back from triage.  Notably I do not feel like he is a thrombolytic candidate based on his resolution of symptoms and NIH of 0 at this time.  Differential diagnosis including CVA, TIA, seizure, metabolic encephalopathy, hypertensive encephalopathy      Patient remained neurologically stable during the emergency room course.  With description that he is back to his baseline mental status.  He was evaluated by neurology in consultation.  MRI of the brain has been ordered for further evaluation.  He will be admitted to [facility] for further neurologic evaluation.  Case discussed with  with the hospitalist service who is agreeable plan of admission.  Patient and family advised of findings and agreeable to plan of admission.      Amount and/or Complexity of Data Reviewed  Labs: ordered.     Details: CBC essentially normal, some borderline polycythemia; comprehensive metabolic panel normal, coags normal, high-sensitivity troponin normal  Radiology: ordered and independent interpretation performed.     Details: My independent interpretation of CT head image no apparent acute hemorrhage, old stroke; chest x-ray no apparent acute process  ECG/medicine tests: ordered and independent interpretation performed.     Details: My EKG  interpretation sinus rhythm rate of 62, no acute ST or T wave abnormality    Risk  Prescription drug management.        Final diagnoses:   Confusion       ED Disposition  ED Disposition       ED Disposition   Decision to Admit    Condition   --    Comment   Level of Care: Telemetry [5]   Admitting Physician: MONTANA BERUMEN [[id number]]   Attending Physician: MONTANA BERUMEN [[id number]]   Bed Request Comments: veronica                 No follow-up provider specified.       Medication List      No changes were made to your prescriptions during this visit.            Dony Pastor MD  05/17/24 [id number]

## 2024-05-17 NOTE — Clinical Note
Level of Care: Med/Surg [1]   Diagnosis: Aphasia [784.3.ICD-9-CM]   Admitting Physician: MONTANA BERUMEN [[id number]]   Attending Physician: MONTANA BERUMEN [[id number]]   Certification: I Certify That Inpatient Hospital Services Are Medically Necessary For Greater Than 2 Midnights

## 2024-05-17 NOTE — TELEPHONE ENCOUNTER
Pt and wife came into office asking about Echo and doppler results from testing Dr. Seipel had ordered but they did not get results. I spoke with Dr. Seipel and he confirmed echo was abnormal, doppler was normal but he would need to see cardiology. I spoke with patient and wife regarding results and was able to call cardiology and get him in 5/22/24. Spoke with wife and they are aware of appt. Can you put in referral?

## 2024-05-18 ENCOUNTER — APPOINTMENT (OUTPATIENT)
Dept: CARDIOLOGY | Facility: HOSPITAL | Age: 64
End: 2024-05-18
Payer: COMMERCIAL

## 2024-05-18 ENCOUNTER — APPOINTMENT (OUTPATIENT)
Dept: CT IMAGING | Facility: HOSPITAL | Age: 64
End: 2024-05-18
Payer: COMMERCIAL

## 2024-05-18 LAB
ANION GAP SERPL CALCULATED.3IONS-SCNC: 9 MMOL/L (ref 5–15)
BASOPHILS # BLD AUTO: 0.07 10*3/MM3 (ref 0–0.2)
BASOPHILS NFR BLD AUTO: 0.8 % (ref 0–1.5)
BH CV ECHO LEFT VENTRICLE GLOBAL LONGITUDINAL STRAIN: -19.1 %
BH CV ECHO MEAS - ACS: 1.8 CM
BH CV ECHO MEAS - AO MAX PG: 4.7 MMHG
BH CV ECHO MEAS - AO MEAN PG: 2 MMHG
BH CV ECHO MEAS - AO ROOT DIAM: 4 CM
BH CV ECHO MEAS - AO V2 MAX: 108 CM/SEC
BH CV ECHO MEAS - AO V2 VTI: 26.3 CM
BH CV ECHO MEAS - AVA(I,D): 3.1 CM2
BH CV ECHO MEAS - EDV(CUBED): 125 ML
BH CV ECHO MEAS - EDV(MOD-SP2): 125 ML
BH CV ECHO MEAS - EDV(MOD-SP4): 123 ML
BH CV ECHO MEAS - EF(MOD-BP): 59 %
BH CV ECHO MEAS - EF(MOD-SP2): 61.4 %
BH CV ECHO MEAS - EF(MOD-SP4): 62.4 %
BH CV ECHO MEAS - ESV(CUBED): 32.8 ML
BH CV ECHO MEAS - ESV(MOD-SP2): 48.2 ML
BH CV ECHO MEAS - ESV(MOD-SP4): 46.2 ML
BH CV ECHO MEAS - FS: 36 %
BH CV ECHO MEAS - IVS/LVPW: 1 CM
BH CV ECHO MEAS - IVSD: 1 CM
BH CV ECHO MEAS - LA DIMENSION: 3.9 CM
BH CV ECHO MEAS - LAT PEAK E' VEL: 10.9 CM/SEC
BH CV ECHO MEAS - LV DIASTOLIC VOL/BSA (35-75): 64.3 CM2
BH CV ECHO MEAS - LV MASS(C)D: 182 GRAMS
BH CV ECHO MEAS - LV MAX PG: 4.6 MMHG
BH CV ECHO MEAS - LV MEAN PG: 2 MMHG
BH CV ECHO MEAS - LV SYSTOLIC VOL/BSA (12-30): 24.1 CM2
BH CV ECHO MEAS - LV V1 MAX: 107 CM/SEC
BH CV ECHO MEAS - LV V1 VTI: 21.6 CM
BH CV ECHO MEAS - LVIDD: 5 CM
BH CV ECHO MEAS - LVIDS: 3.2 CM
BH CV ECHO MEAS - LVOT AREA: 3.8 CM2
BH CV ECHO MEAS - LVOT DIAM: 2.2 CM
BH CV ECHO MEAS - LVPWD: 1 CM
BH CV ECHO MEAS - MED PEAK E' VEL: 7.8 CM/SEC
BH CV ECHO MEAS - MR MAX PG: 52.4 MMHG
BH CV ECHO MEAS - MR MAX VEL: 362 CM/SEC
BH CV ECHO MEAS - MV A MAX VEL: 60.3 CM/SEC
BH CV ECHO MEAS - MV DEC SLOPE: 326 CM/SEC2
BH CV ECHO MEAS - MV DEC TIME: 0.23 SEC
BH CV ECHO MEAS - MV E MAX VEL: 57.6 CM/SEC
BH CV ECHO MEAS - MV E/A: 0.96
BH CV ECHO MEAS - MV MAX PG: 2.31 MMHG
BH CV ECHO MEAS - MV MEAN PG: 1 MMHG
BH CV ECHO MEAS - MV P1/2T: 69.5 MSEC
BH CV ECHO MEAS - MV V2 VTI: 29 CM
BH CV ECHO MEAS - MVA(P1/2T): 3.2 CM2
BH CV ECHO MEAS - MVA(VTI): 2.8 CM2
BH CV ECHO MEAS - PA V2 MAX: 89.6 CM/SEC
BH CV ECHO MEAS - PULM A REVS DUR: 0.16 SEC
BH CV ECHO MEAS - PULM A REVS VEL: 33.6 CM/SEC
BH CV ECHO MEAS - PULM DIAS VEL: 44.5 CM/SEC
BH CV ECHO MEAS - PULM S/D: 1.1
BH CV ECHO MEAS - PULM SYS VEL: 48.8 CM/SEC
BH CV ECHO MEAS - RAP SYSTOLE: 3 MMHG
BH CV ECHO MEAS - RVDD: 3 CM
BH CV ECHO MEAS - RVOT DIAM: 3 CM
BH CV ECHO MEAS - RVSP: 34 MMHG
BH CV ECHO MEAS - SV(LVOT): 82.1 ML
BH CV ECHO MEAS - SV(MOD-SP2): 76.8 ML
BH CV ECHO MEAS - SV(MOD-SP4): 76.8 ML
BH CV ECHO MEAS - SVI(LVOT): 42.9 ML/M2
BH CV ECHO MEAS - SVI(MOD-SP2): 40.1 ML/M2
BH CV ECHO MEAS - SVI(MOD-SP4): 40.1 ML/M2
BH CV ECHO MEAS - TAPSE (>1.6): 2.11 CM
BH CV ECHO MEAS - TR MAX PG: 30.9 MMHG
BH CV ECHO MEAS - TR MAX VEL: 278 CM/SEC
BH CV ECHO MEASUREMENTS AVERAGE E/E' RATIO: 6.16
BH CV XLRA - RV BASE: 3.1 CM
BH CV XLRA - RV LENGTH: 7.5 CM
BH CV XLRA - RV MID: 2 CM
BH CV XLRA - TDI S': 12 CM/SEC
BUN SERPL-MCNC: 15 MG/DL (ref 8–23)
BUN/CREAT SERPL: 17.9 (ref 7–25)
CALCIUM SPEC-SCNC: 9 MG/DL (ref 8.6–10.5)
CHLORIDE SERPL-SCNC: 111 MMOL/L (ref 98–107)
CO2 SERPL-SCNC: 25 MMOL/L (ref 22–29)
CREAT SERPL-MCNC: 0.84 MG/DL (ref 0.76–1.27)
DEPRECATED RDW RBC AUTO: 44.1 FL (ref 37–54)
EGFRCR SERPLBLD CKD-EPI 2021: 97.4 ML/MIN/1.73
EOSINOPHIL # BLD AUTO: 0.4 10*3/MM3 (ref 0–0.4)
EOSINOPHIL NFR BLD AUTO: 4.8 % (ref 0.3–6.2)
ERYTHROCYTE [DISTWIDTH] IN BLOOD BY AUTOMATED COUNT: 12.3 % (ref 12.3–15.4)
GLUCOSE SERPL-MCNC: 88 MG/DL (ref 65–99)
HCT VFR BLD AUTO: 46.9 % (ref 37.5–51)
HGB BLD-MCNC: 16 G/DL (ref 13–17.7)
IMM GRANULOCYTES # BLD AUTO: 0.01 10*3/MM3 (ref 0–0.05)
IMM GRANULOCYTES NFR BLD AUTO: 0.1 % (ref 0–0.5)
LEFT ATRIUM VOLUME INDEX: 22.9 ML/M2
LYMPHOCYTES # BLD AUTO: 3.25 10*3/MM3 (ref 0.7–3.1)
LYMPHOCYTES NFR BLD AUTO: 38.6 % (ref 19.6–45.3)
MAGNESIUM SERPL-MCNC: 2.3 MG/DL (ref 1.6–2.4)
MCH RBC QN AUTO: 33.1 PG (ref 26.6–33)
MCHC RBC AUTO-ENTMCNC: 34.1 G/DL (ref 31.5–35.7)
MCV RBC AUTO: 97.1 FL (ref 79–97)
MONOCYTES # BLD AUTO: 0.6 10*3/MM3 (ref 0.1–0.9)
MONOCYTES NFR BLD AUTO: 7.1 % (ref 5–12)
NEUTROPHILS NFR BLD AUTO: 4.09 10*3/MM3 (ref 1.7–7)
NEUTROPHILS NFR BLD AUTO: 48.6 % (ref 42.7–76)
NRBC BLD AUTO-RTO: 0 /100 WBC (ref 0–0.2)
PHOSPHATE SERPL-MCNC: 3.3 MG/DL (ref 2.5–4.5)
PLATELET # BLD AUTO: 192 10*3/MM3 (ref 140–450)
PMV BLD AUTO: 10.6 FL (ref 6–12)
POTASSIUM SERPL-SCNC: 3.3 MMOL/L (ref 3.5–5.2)
POTASSIUM SERPL-SCNC: 4 MMOL/L (ref 3.5–5.2)
QT INTERVAL: 418 MS
QTC INTERVAL: 426 MS
RBC # BLD AUTO: 4.83 10*6/MM3 (ref 4.14–5.8)
SINUS: 4 CM
SODIUM SERPL-SCNC: 145 MMOL/L (ref 136–145)
STJ: 2.8 CM
WBC NRBC COR # BLD AUTO: 8.42 10*3/MM3 (ref 3.4–10.8)

## 2024-05-18 PROCEDURE — 80048 BASIC METABOLIC PNL TOTAL CA: CPT | Performed by: INTERNAL MEDICINE

## 2024-05-18 PROCEDURE — G0378 HOSPITAL OBSERVATION PER HR: HCPCS

## 2024-05-18 PROCEDURE — 93306 TTE W/DOPPLER COMPLETE: CPT | Performed by: INTERNAL MEDICINE

## 2024-05-18 PROCEDURE — 99214 OFFICE O/P EST MOD 30 MIN: CPT | Performed by: STUDENT IN AN ORGANIZED HEALTH CARE EDUCATION/TRAINING PROGRAM

## 2024-05-18 PROCEDURE — 84100 ASSAY OF PHOSPHORUS: CPT | Performed by: INTERNAL MEDICINE

## 2024-05-18 PROCEDURE — 71275 CT ANGIOGRAPHY CHEST: CPT

## 2024-05-18 PROCEDURE — 83735 ASSAY OF MAGNESIUM: CPT | Performed by: INTERNAL MEDICINE

## 2024-05-18 PROCEDURE — 93356 MYOCRD STRAIN IMG SPCKL TRCK: CPT

## 2024-05-18 PROCEDURE — 85025 COMPLETE CBC W/AUTO DIFF WBC: CPT | Performed by: INTERNAL MEDICINE

## 2024-05-18 PROCEDURE — 25510000001 IOPAMIDOL PER 1 ML: Performed by: HOSPITALIST

## 2024-05-18 PROCEDURE — 93306 TTE W/DOPPLER COMPLETE: CPT

## 2024-05-18 PROCEDURE — 84132 ASSAY OF SERUM POTASSIUM: CPT | Performed by: HOSPITALIST

## 2024-05-18 PROCEDURE — 93356 MYOCRD STRAIN IMG SPCKL TRCK: CPT | Performed by: INTERNAL MEDICINE

## 2024-05-18 RX ORDER — POTASSIUM CHLORIDE 20 MEQ/1
40 TABLET, EXTENDED RELEASE ORAL EVERY 4 HOURS
Status: COMPLETED | OUTPATIENT
Start: 2024-05-18 | End: 2024-05-18

## 2024-05-18 RX ORDER — RISPERIDONE 0.5 MG/1
0.5 TABLET, ORALLY DISINTEGRATING ORAL ONCE AS NEEDED
Status: COMPLETED | OUTPATIENT
Start: 2024-05-18 | End: 2024-05-18

## 2024-05-18 RX ORDER — NICOTINE 21 MG/24HR
1 PATCH, TRANSDERMAL 24 HOURS TRANSDERMAL NIGHTLY
Status: DISCONTINUED | OUTPATIENT
Start: 2024-05-18 | End: 2024-05-19 | Stop reason: HOSPADM

## 2024-05-18 RX ADMIN — Medication 10 ML: at 20:36

## 2024-05-18 RX ADMIN — DONEPEZIL HYDROCHLORIDE 5 MG: 5 TABLET, FILM COATED ORAL at 20:35

## 2024-05-18 RX ADMIN — ASPIRIN 81 MG: 81 TABLET, COATED ORAL at 08:59

## 2024-05-18 RX ADMIN — Medication 1 PATCH: at 20:35

## 2024-05-18 RX ADMIN — ATORVASTATIN CALCIUM 40 MG: 40 TABLET, FILM COATED ORAL at 20:35

## 2024-05-18 RX ADMIN — RISPERIDONE 0.5 MG: 0.5 TABLET, ORALLY DISINTEGRATING ORAL at 20:35

## 2024-05-18 RX ADMIN — Medication 10 ML: at 09:05

## 2024-05-18 RX ADMIN — Medication 5 MG: at 20:35

## 2024-05-18 RX ADMIN — POTASSIUM CHLORIDE 40 MEQ: 1500 TABLET, EXTENDED RELEASE ORAL at 12:20

## 2024-05-18 RX ADMIN — POTASSIUM CHLORIDE 40 MEQ: 1500 TABLET, EXTENDED RELEASE ORAL at 08:59

## 2024-05-18 RX ADMIN — IOPAMIDOL 100 ML: 755 INJECTION, SOLUTION INTRAVENOUS at 17:43

## 2024-05-18 NOTE — PLAN OF CARE
Problem: Adult Inpatient Plan of Care  Goal: Plan of Care Review  Outcome: Ongoing, Progressing  Flowsheets (Taken 5/18/2024 0600)  Progress: no change  Plan of Care Reviewed With:   patient   sibling  Outcome Evaluation: Patient slept well through the night with sister at bedside. No concerns or complaints. AxO x2 which is patients baseline per sister. Q4 neuro checks. Patient has been bradycardic through the night, Cardiology consult and echo ordered for today. CT Angio Chest ordered for today to check for possible AAA.   Goal Outcome Evaluation:  Plan of Care Reviewed With: patient, sibling        Progress: no change  Outcome Evaluation: Patient slept well through the night with sister at bedside. No concerns or complaints. AxO x2 which is patients baseline per sister. Q4 neuro checks. Patient has been bradycardic through the night, Cardiology consult and echo ordered for today. CT Angio Chest ordered for today to check for possible AAA.

## 2024-05-18 NOTE — ED NOTES
Nursing report ED to floor  Navid Stearns  64 y.o.  male    HPI:   Chief Complaint   Patient presents with    Tingling       Admitting doctor:   Jag Mosher MD    Admitting diagnosis:   The encounter diagnosis was Confusion.    Code status:   Current Code Status       Date Active Code Status Order ID Comments User Context       5/17/2024 2023 CPR (Attempt to Resuscitate) [id number]  Jag Mosher MD ED        Question Answer    Code Status (Patient has no pulse and is not breathing) CPR (Attempt to Resuscitate)    Medical Interventions (Patient has pulse or is breathing) Full Support                    Allergies:   Penicillins    Isolation:  No active isolations     Fall Risk:  Fall Risk Assessment was completed, and patient is at moderate risk for falls.   Predictive Model Details         9 (Low) Factor Value    Calculated 5/17/2024 21:36 Age 64    Risk of Fall Model Active Peripheral IV Present     Imaging order in this encounter Present     Magnesium not on file     Number of Distinct Medication Classes administered 3     Diastolic BP 69     Tobacco Use Current     Georges Scale not on file     Respiratory Rate 13     Clinically Relevant Sex Not Female     Calcium 9.1 mg/dL     Total Bilirubin 0.6 mg/dL     Days after Admission 0.232     Chloride 106 mmol/L     ALT 22 U/L     Albumin 4.4 g/dL     Creatinine 0.97 mg/dL     Potassium 3.7 mmol/L         Weight:       05/17/24  1600   Weight: 75.8 kg (167 lb)       Intake and Output    Intake/Output Summary (Last 24 hours) at 5/17/2024 2136  Last data filed at 5/17/2024 1856  Gross per 24 hour   Intake 500 ml   Output --   Net 500 ml       Diet:   Dietary Orders (From admission, onward)       Start     Ordered    05/17/24 1752  Diet: Cardiac; Healthy Heart (2-3 Na+); Fluid Consistency: Thin (IDDSI 0)  Diet Effective Now        References:    Diet Order [facility]   Question Answer Comment   Diets: Cardiac    Cardiac Diet: Healthy Heart (2-3 Na+)    Fluid  "Consistency: Thin (IDDSI 0)        05/17/24 1751                     Most recent vitals:   Vitals:    05/17/24 1600 05/17/24 2058 05/17/24 2118   BP: 143/68 116/69 128/69   BP Location: Right arm     Patient Position: Sitting     Pulse: 63 50 51   Resp: 17 13    Temp: 98.6 °F (37 °C)     TempSrc: Oral     SpO2: 97% 90% 90%   Weight: 75.8 kg (167 lb)     Height: 175.3 cm (69\")         Active LDAs/IV Access:   Lines, Drains & Airways       Active LDAs       Name Placement date Placement time Site Days    Peripheral IV 05/17/24 1615 Left Antecubital 05/17/24 1615  Antecubital  less than 1                    Skin Condition:   Skin Assessments (last day)       Date/Time Interval    05/17/24 16:15:18 baseline             Labs (abnormal labs have a star):   Labs Reviewed   COMPREHENSIVE METABOLIC PANEL - Abnormal; Notable for the following components:       Result Value    Glucose 112 (*)     All other components within normal limits    Narrative:     GFR Normal >60  Chronic Kidney Disease <60  Kidney Failure <15     CBC WITH AUTO DIFFERENTIAL - Abnormal; Notable for the following components:    Hemoglobin 17.9 (*)     Hematocrit 51.1 (*)     MCV 97.1 (*)     MCH 34.0 (*)     RDW 12.1 (*)     All other components within normal limits   HEMOGLOBIN A1C - Abnormal; Notable for the following components:    Hemoglobin A1C 6.30 (*)     All other components within normal limits   LIPID PANEL - Abnormal; Notable for the following components:    Triglycerides 196 (*)     HDL Cholesterol 33 (*)     All other components within normal limits    Narrative:     Cholesterol Reference Ranges  (U.S. Department of Health and Human Services ATP III Classifications)    Desirable          <200 mg/dL  Borderline High    200-239 mg/dL  High Risk          >240 mg/dL      Triglyceride Reference Ranges  (U.S. Department of Health and Human Services ATP III Classifications)    Normal           <150 mg/dL  Borderline High  150-199 mg/dL  High        "      200-499 mg/dL  Very High        >500 mg/dL    HDL Reference Ranges  (U.S. Department of Health and Human Services ATP III Classifications)    Low     <40 mg/dl (major risk factor for CHD)  High    >60 mg/dl ('negative' risk factor for CHD)        LDL Reference Ranges  (U.S. Department of Health and Human Services ATP III Classifications)    Optimal          <100 mg/dL  Near Optimal     100-129 mg/dL  Borderline High  130-159 mg/dL  High             160-189 mg/dL  Very High        >189 mg/dL   POCT GLUCOSE FINGERSTICK - Abnormal; Notable for the following components:    Glucose 120 (*)     All other components within normal limits   PROTIME-INR - Normal   APTT - Normal   SINGLE HS TROPONIN T - Normal    Narrative:     High Sensitive Troponin T Reference Range:  <14.0 ng/L- Negative Female for AMI  <22.0 ng/L- Negative Male for AMI  >=14 - Abnormal Female indicating possible myocardial injury.  >=22 - Abnormal Male indicating possible myocardial injury.   Clinicians would have to utilize clinical acumen, EKG, Troponin, and serial changes to determine if it is an Acute Myocardial Infarction or myocardial injury due to an underlying chronic condition.        TSH - Normal   RAINBOW DRAW    Narrative:     The following orders were created for panel order [facility] Draw.  Procedure                               Abnormality         Status                     ---------                               -----------         ------                     Green Top (Gel)[[id number]]                                  Final result               Lavender Top[[id number]]                                     Final result               Gold Top - SST[[id number]]                                   Final result               Light Blue Top[[id number]]                                   Final result                 Please view results for these tests on the individual orders.   POCT GLUCOSE FINGERSTICK   TYPE AND SCREEN   CBC AND DIFFERENTIAL     Narrative:     The following orders were created for panel order CBC & Differential.  Procedure                               Abnormality         Status                     ---------                               -----------         ------                     CBC Auto Differential[[id number]]        Abnormal            Final result                 Please view results for these tests on the individual orders.   GREEN TOP   LAVENDER TOP   GOLD TOP - SST   LIGHT BLUE TOP       LOC: Person, Place, Time, and Situation    Telemetry:  Telemetry    Cardiac Monitoring Ordered: yes    EKG:   ECG 12 Lead Stroke Evaluation   Preliminary Result   HEART RATE= 62  bpm   RR Interval= 964  ms   AZ Interval= 188  ms   P Horizontal Axis= 15  deg   P Front Axis= 56  deg   QRSD Interval= 110  ms   QT Interval= 418  ms   QTcB= 426  ms   QRS Axis= 50  deg   T Wave Axis= 55  deg   - NORMAL ECG -   Sinus rhythm   No previous ECG available for comparison   Electronically Signed By:    Date and Time of Study: 2024-05-17 16:33:35          Medications Given in the ED:   Medications   sodium chloride 0.9 % flush 10 mL (has no administration in time range)   sodium chloride 0.9 % flush 10 mL (has no administration in time range)   sodium chloride 0.9 % flush 10 mL (has no administration in time range)   sodium chloride 0.9 % infusion 40 mL (has no administration in time range)   nitroglycerin (NITROSTAT) SL tablet 0.4 mg (has no administration in time range)   Potassium Replacement - Follow Nurse / BPA Driven Protocol (has no administration in time range)   Magnesium Standard Dose Replacement - Follow Nurse / BPA Driven Protocol (has no administration in time range)   Phosphorus Replacement - Follow Nurse / BPA Driven Protocol (has no administration in time range)   Calcium Replacement - Follow Nurse / BPA Driven Protocol (has no administration in time range)   acetaminophen (TYLENOL) tablet 650 mg (has no administration in time range)   melatonin  tablet 5 mg (has no administration in time range)   sennosides-docusate (PERICOLACE) 8.6-50 MG per tablet 2 tablet (has no administration in time range)     And   polyethylene glycol (MIRALAX) packet 17 g (has no administration in time range)     And   bisacodyl (DULCOLAX) EC tablet 5 mg (has no administration in time range)     And   bisacodyl (DULCOLAX) suppository 10 mg (has no administration in time range)   ondansetron ODT (ZOFRAN-ODT) disintegrating tablet 4 mg (has no administration in time range)     Or   ondansetron (ZOFRAN) injection 4 mg (has no administration in time range)   aluminum-magnesium hydroxide-simethicone (MAALOX MAX) 400-400-40 MG/5ML suspension 15 mL (has no administration in time range)   donepezil (ARICEPT) tablet 5 mg (has no administration in time range)   nebivolol (BYSTOLIC) tablet 20 mg ( Oral Dose Auto Held 5/25/24 2100)   amLODIPine (NORVASC) tablet 10 mg ( Oral Dose Auto Held 5/26/24 0700)   clopidogrel (PLAVIX) tablet 75 mg ( Oral Dose Auto Held 5/25/24 0900)   aspirin EC tablet 81 mg (has no administration in time range)   atorvastatin (LIPITOR) tablet 40 mg (has no administration in time range)   sodium chloride 0.9 % bolus 500 mL (0 mL Intravenous Stopped 5/17/24 1856)   iopamidol (ISOVUE-370) 76 % injection 100 mL (100 mL Intravenous Given 5/17/24 1631)   OLANZapine (zyPREXA) injection 5 mg (5 mg Intramuscular Given 5/17/24 1858)       Imaging results:  MRI Brain Without Contrast    Result Date: 5/17/2024  Impression: 1. No acute ischemic change. 2. Remote infarct left parietal occipital region. 3. White matter changes compatible with small vessel ischemic disease in this age group without significant change given differences in technique to the comparison Electronically Signed: Lamont Turk MD  5/17/2024 8:22 PM EDT  Workstation ID: [id number]    XR Chest 1 View    Result Date: 5/17/2024  Impression: No acute cardiopulmonary abnormality. Electronically Signed: Jonathan Bonner  MD  5/17/2024 5:13 PM EDT  Workstation ID: [id number]    CT Angiogram Head w AI Analysis of LVO    Result Date: 5/17/2024  Mild atheromatous disease. No vessel occlusion or severe stenosis. Electronically Signed: Tyrone Blank MD  5/17/2024 4:44 PM EDT  Workstation ID: [id number]    CT Angiogram Neck    Result Date: 5/17/2024  Mild atheromatous disease. No vessel occlusion or severe stenosis. Electronically Signed: Tyrone Blank MD  5/17/2024 4:44 PM EDT  Workstation ID: [id number]    CT Head Without Contrast Stroke Protocol    Result Date: 5/17/2024  1. No acute intracranial hemorrhage or mass effect 2. Remote infarct, area of encephalomalacia again noted left parietal occipital region Electronically Signed: Lamont Turk MD  5/17/2024 4:25 PM EDT  Workstation ID: [id number]     Social issues:   Social History     Socioeconomic History    Marital status:    Tobacco Use    Smoking status: Every Day     Current packs/day: 2.00     Average packs/day: 2.0 packs/day for 45.0 years (90.0 ttl pk-yrs)     Types: Cigarettes    Smokeless tobacco: Never   Substance and Sexual Activity    Alcohol use: Never    Drug use: Never    Sexual activity: Defer       NIH Stroke Scale:  Interval: baseline (05/17/24 1615)  1a. Level of Consciousness: 0-->Alert, keenly responsive (05/17/24 2000)  1b. LOC Questions: 0-->Answers both questions correctly (05/17/24 2000)  1c. LOC Commands: 0-->Performs both tasks correctly (05/17/24 2000)  2. Best Gaze: 0-->Normal (05/17/24 2000)  3. Visual: 1-->Partial hemianopia (05/17/24 2000)  4. Facial Palsy: 0-->Normal symmetrical movements (05/17/24 2000)  5a. Motor Arm, Left: 0-->No drift, limb holds 90 (or 45) degrees for full 10 secs (05/17/24 2000)  5b. Motor Arm, Right: 0-->No drift, limb holds 90 (or 45) degrees for full 10 secs (05/17/24 2000)  6a. Motor Leg, Left: 0-->No drift, leg holds 30 degree position for full 5 secs (05/17/24 2000)  6b. Motor Leg, Right: 0-->No drift, leg holds 30 degree  position for full 5 secs (05/17/24 2000)  7. Limb Ataxia: 0-->Absent (05/17/24 2000)  8. Sensory: 0-->Normal, no sensory loss (05/17/24 2000)  9. Best Language: 0-->No aphasia, normal (05/17/24 2000)  10. Dysarthria: 0-->Normal (05/17/24 2000)  11. Extinction and Inattention (formerly Neglect): 0-->No abnormality (05/17/24 2000)    Total (NIH Stroke Scale): 1 (05/17/24 2000)     Additional notable assessment information:     Nursing report ED to floor:  Lizbeth Bonner LPN   05/17/24 21:36 EDT

## 2024-05-18 NOTE — PLAN OF CARE
Goal Outcome Evaluation:  Plan of Care Reviewed With: patient        Progress: improving  Outcome Evaluation: Patient is not alert on time and situation. Per family, this is patient baseline. EEG ordered today per neurogolgy. ECHO showed EF 59%. still pending on CT chest.  Answered family's questions and concerns.  Potassium replaced this morning

## 2024-05-18 NOTE — CONSULTS
Primary Care Provider: Provider, No Known     Consult requested by:  ED    Reason for Consultation: Neurological evaluation     History taken from: patient chart family RN    Chief complaint: AMS       SUBJECTIVE:    History of present illness: Background per H&P: Navid Stearns is a 64 y.o. year old male who has a PMHx of dementia, prior left PCA stroke on ASA, HLD and HTN.     The patient was reportedly at home and around 1 PM he had an episode of greater than normal confusion and paraphasic errors. He has a history of dementia and has confusion, but not as severe as this episode according to his spouse, who was in the room during my evaluation. His symptoms lasted minutes and completely resolved. About 1 hour later they went to their doctors office to get the results regarding the patients aorta? And were told it was abnormally enlarged and needed attention immediately. After hearing this, the patient again had another episode identical to his last one and recovered within minutes. He was broght to the ED for evaluation and a CODE STROKE was called. NIHSS was 0 on arrival to the ED and on my examination as well. CT and CTA H/N were negative.     Of note, on CT imaging, there is an area of old encephalomalacia within the left PCA territory. The patients wife states that this was identified incidentally about one year ago when he was being worked up for dementia and has been on ASA 81 qD since.     During my interaction with him, his wife states he is at baseline.         - Portions of the above HPI were copied from previous encounters and edited as appropriate. PMH as detailed below.     Review of Systems   Constitutional:  Negative for fever and unexpected weight change.   HENT:  Negative for ear pain, hearing loss, tinnitus, trouble swallowing and voice change.    Eyes:  Negative for photophobia and visual disturbance.   Respiratory:  Negative for chest tightness and shortness of breath.     Cardiovascular:  Negative for chest pain and palpitations.   Gastrointestinal:  Negative for nausea and vomiting.   Genitourinary:  Negative for difficulty urinating, dysuria, frequency and urgency.   Musculoskeletal:  Negative for back pain, gait problem, neck pain and neck stiffness.   Neurological:  Negative for dizziness, tremors, seizures, syncope, facial asymmetry, speech difficulty, weakness, light-headedness, numbness and headaches.   Psychiatric/Behavioral:  Negative for agitation, behavioral problems and confusion.    All other systems reviewed and are negative.         PATIENT HISTORY:  No past medical history on file., No past surgical history on file.,   Family History   Problem Relation Age of Onset    Cancer Mother     Alzheimer's disease Father    ,   Social History     Tobacco Use    Smoking status: Every Day     Current packs/day: 2.00     Average packs/day: 2.0 packs/day for 45.0 years (90.0 ttl pk-yrs)     Types: Cigarettes    Smokeless tobacco: Never   Substance Use Topics    Alcohol use: Never    Drug use: Never   ,   Prior to Admission medications    Medication Sig Start Date End Date Taking? Authorizing Provider   amLODIPine (NORVASC) 10 MG tablet Take 1 tablet by mouth Every Morning. 10/4/23  Yes Nicolás Mcconnell MD   aspirin 81 MG EC tablet Take 1 tablet by mouth Daily.   Yes Nicolás Mcconnell MD   atorvastatin (LIPITOR) 10 MG tablet Take 1 tablet by mouth Every Night. 9/18/23  Yes Nicolás Mcconnell MD   donepezil (ARICEPT) 5 MG tablet TAKE 1 TABLET BY MOUTH EVERY DAY AT NIGHT 4/10/24  Yes Seipel, Joseph F, MD   escitalopram (LEXAPRO) 5 MG tablet Take 1 tablet by mouth Every Night. 11/20/23  Yes Nicolás Mcconnell MD   multivitamin with minerals tablet tablet Take 1 tablet by mouth Daily.   Yes Nicolás Mcconnell MD   nebivolol (BYSTOLIC) 20 MG tablet Take 1 tablet by mouth Every Night. 11/19/23  Yes Nicolás Mcconnell MD   triamterene-hydrochlorothiazide  (MAXZIDE-25) 37.5-25 MG per tablet Take 1 tablet by mouth Every Morning. 10/4/23 5/17/24  Provider, MD Nicolás    Allergies:  Penicillins    Current Facility-Administered Medications   Medication Dose Route Frequency Provider Last Rate Last Admin    acetaminophen (TYLENOL) tablet 650 mg  650 mg Oral Q4H PRN Jag Mosher MD        aluminum-magnesium hydroxide-simethicone (MAALOX MAX) 400-400-40 MG/5ML suspension 15 mL  15 mL Oral Q6H PRN Jag Mosher MD        [Held by provider] amLODIPine (NORVASC) tablet 10 mg  10 mg Oral QAM Jag Mosher MD        [START ON 5/18/2024] aspirin EC tablet 81 mg  81 mg Oral Daily Jag Mosher MD        atorvastatin (LIPITOR) tablet 40 mg  40 mg Oral Nightly Jag Mosher MD        sennosides-docusate (PERICOLACE) 8.6-50 MG per tablet 2 tablet  2 tablet Oral BID PRN Jag Mosher MD        And    polyethylene glycol (MIRALAX) packet 17 g  17 g Oral Daily PRN Jag Mosher MD        And    bisacodyl (DULCOLAX) EC tablet 5 mg  5 mg Oral Daily PRN Jag Mosher MD        And    bisacodyl (DULCOLAX) suppository 10 mg  10 mg Rectal Daily PRN Jag Mosher MD        Calcium Replacement - Follow Nurse / BPA Driven Protocol   Does not apply Jag Malave MD        [Held by provider] clopidogrel (PLAVIX) tablet 75 mg  75 mg Oral Daily Jag Mosher MD        donepezil (ARICEPT) tablet 5 mg  5 mg Oral Nightly Jag Mosher MD        Magnesium Standard Dose Replacement - Follow Nurse / BPA Driven Protocol   Does not apply Jag Malave MD        melatonin tablet 5 mg  5 mg Oral Nightly PRN Jag Mosher MD        [Held by provider] nebivolol (BYSTOLIC) tablet 20 mg  20 mg Oral Nightly Jag Mosher MD        nitroglycerin (NITROSTAT) SL tablet 0.4 mg  0.4 mg Sublingual Q5 Min PRN Jag Mosher MD        ondansetron ODT (ZOFRAN-ODT) disintegrating tablet 4 mg  4 mg Oral Q6H PRN Jag Mosher MD        Or    ondansetron (ZOFRAN) injection 4 mg  4 mg Intravenous Q6H PRN Jag Mosher MD        Phosphorus Replacement -  Follow Nurse / BPA Driven Protocol   Does not apply Jag Malave MD        Potassium Replacement - Follow Nurse / BPA Driven Protocol   Does not apply Jag Malave MD        sodium chloride 0.9 % flush 10 mL  10 mL Intravenous PRN Dony Pastor MD        sodium chloride 0.9 % flush 10 mL  10 mL Intravenous Q12H Jag Mosher MD        sodium chloride 0.9 % flush 10 mL  10 mL Intravenous PRN Jag Mosher MD        sodium chloride 0.9 % infusion 40 mL  40 mL Intravenous PRN Jag Mosher MD         Current Outpatient Medications   Medication Sig Dispense Refill    amLODIPine (NORVASC) 10 MG tablet Take 1 tablet by mouth Every Morning.      aspirin 81 MG EC tablet Take 1 tablet by mouth Daily.      atorvastatin (LIPITOR) 10 MG tablet Take 1 tablet by mouth Every Night.      donepezil (ARICEPT) 5 MG tablet TAKE 1 TABLET BY MOUTH EVERY DAY AT NIGHT 90 tablet 1    escitalopram (LEXAPRO) 5 MG tablet Take 1 tablet by mouth Every Night.      multivitamin with minerals tablet tablet Take 1 tablet by mouth Daily.      nebivolol (BYSTOLIC) 20 MG tablet Take 1 tablet by mouth Every Night.          ________________________________________________________        OBJECTIVE:  Upon today's exam,      Neurologic Exam     Mental Status   Oriented to person, place, and time.   Oriented to person.   Oriented to place. Oriented to city.   Oriented to time. Oriented to year, month, date and day.   Registration: recalls 3 of 3 objects. Recall at 5 minutes: recalls 3 of 3 objects. Follows 3 step commands.   Attention: normal. Concentration: normal.   Speech: speech is normal   Level of consciousness: alert  Knowledge: good.   Able to name object. Able to read. Able to repeat. Able to write. Normal comprehension.     Cranial Nerves   Cranial nerves II through XII intact.     CN II   Visual fields full to confrontation.   Visual acuity: normal    CN III, IV, VI   Pupils are equal, round, and reactive to light.  Extraocular motions are  normal.     CN V   Facial sensation intact.     CN VII   Facial expression full, symmetric.     CN VIII   CN VIII normal.     CN IX, X   CN IX normal.   CN X normal.     CN XI   CN XI normal.     CN XII   CN XII normal.     Motor Exam   Muscle bulk: normal  Overall muscle tone: normal  Right arm pronator drift: absent  Left arm pronator drift: absent    Strength   Strength 5/5 throughout.     Sensory Exam   Light touch normal.   Vibration normal.   Proprioception normal.   Pinprick normal.     Gait, Coordination, and Reflexes     Reflexes   Right brachioradialis: 2+  Left brachioradialis: 2+  Right biceps: 2+  Left biceps: 2+  Right triceps: 2+  Left triceps: 2+  Right patellar: 2+  Left patellar: 2+  Right achilles: 2+  Left achilles: 2+  Right plantar: normal  Left plantar: normal  Right ankle clonus: absent  Left ankle clonus: absent      ________________________________________________________   RESULTS REVIEW:    VITAL SIGNS:   Temp:  [98.6 °F (37 °C)] 98.6 °F (37 °C)  Heart Rate:  [63] 63  Resp:  [17] 17  BP: (143)/(68) 143/68     LABS:      Lab 05/17/24  1616   WBC 7.99   HEMOGLOBIN 17.9*   HEMATOCRIT 51.1*   PLATELETS 223   NEUTROS ABS 4.34   IMMATURE GRANS (ABS) 0.01   LYMPHS ABS 2.61   MONOS ABS 0.68   EOS ABS 0.28   MCV 97.1*   PROTIME 10.9   APTT 30.3         Lab 05/17/24  1616   SODIUM 143   POTASSIUM 3.7   CHLORIDE 106   CO2 25.0   ANION GAP 12.0   BUN 16   CREATININE 0.97   EGFR 87.2   GLUCOSE 112*   CALCIUM 9.1   HEMOGLOBIN A1C 6.30*   TSH 1.520         Lab 05/17/24  1616   TOTAL PROTEIN 7.2   ALBUMIN 4.4   GLOBULIN 2.8   ALT (SGPT) 22   AST (SGOT) 18   BILIRUBIN 0.6   ALK PHOS 111         Lab 05/17/24  1616   HSTROP T <6   PROTIME 10.9   INR 1.00         Lab 05/17/24  1616   CHOLESTEROL 106   LDL CHOL 41   HDL CHOL 33*   TRIGLYCERIDES 196*         Lab 05/17/24  1653   ABO TYPING B   RH TYPING Positive   ANTIBODY SCREEN Negative             Lab Results   Component Value Date    TSH 1.520 05/17/2024     LDL 41 05/17/2024    HGBA1C 6.30 (H) 05/17/2024       IMAGING STUDIES:  XR Chest 1 View    Result Date: 5/17/2024  Impression: No acute cardiopulmonary abnormality. Electronically Signed: Jonathan Bonner MD  5/17/2024 5:13 PM EDT  Workstation ID: [id number]    CT Angiogram Head w AI Analysis of LVO    Result Date: 5/17/2024  Mild atheromatous disease. No vessel occlusion or severe stenosis. Electronically Signed: Tyrone Blank MD  5/17/2024 4:44 PM EDT  Workstation ID: [id number]    CT Angiogram Neck    Result Date: 5/17/2024  Mild atheromatous disease. No vessel occlusion or severe stenosis. Electronically Signed: Tyrone Blank MD  5/17/2024 4:44 PM EDT  Workstation ID: [id number]    CT Head Without Contrast Stroke Protocol    Result Date: 5/17/2024  1. No acute intracranial hemorrhage or mass effect 2. Remote infarct, area of encephalomalacia again noted left parietal occipital region Electronically Signed: Lamont Turk MD  5/17/2024 4:25 PM EDT  Workstation ID: [id number]     I reviewed the patient's new clinical results.    ________________________________________________________     PROBLEM LIST:    Aphasia            ASSESSMENT/PLAN:      \\aphasia with altered mental status\\    CTH negative for an acute intracranial process. Remote infarct, area of encephalomalacia noted in the left parietal occipital region.   CTA H/N negative   MRI Brain WO pending  ECHO w/bubble study  C/w ASA 81 qD PO  Lipitor 40 mg qHS  Lipid panel - pending   HgA1c - pending  Recommend cardiology consult for aortic enlargement? Family mentioned he had severely enlarged aorta that was identified earlier today. Primary team will obtain CT angio chest  Stroke panel  Neurology will follow           Modification of stroke risk factors:   - Blood pressure should be less than 130/80 outpatient, HbA1c less than 6.5, LDL less than 70; b12>500 and smoking cessation if applicable. We would be grateful if the primary team / primary care physician would  keep a close watch on the above targets.  - Stroke education  - Follow up with neurologist of choice      I discussed the patient's findings and my recommendations with patient, family, nursing staff, and primary care team    Rudi Ramirez MD  05/17/24  20:21 EDT

## 2024-05-18 NOTE — PROGRESS NOTES
[facility]     Progress Note    Patient Name: Navid Stearns  : 1960  MRN: [id number]  Primary Care Physician:  Mary Lou Sykes MD  Date of admission: 2024  Service date and time: 24 13:23 EDT  Subjective   Subjective     Chief Complaint: AMS    HPI:  Patient doing better, family at bedside getting echo done      Objective   Objective     Vitals:   Temp:  [97.5 °F (36.4 °C)-98.6 °F (37 °C)] 97.6 °F (36.4 °C)  Heart Rate:  [46-63] 51  Resp:  [13-17] 16  BP: (116-157)/(59-76) 157/59  Physical Exam    Constitutional: Awake, alert   Eyes: PERRLA, sclerae anicteric, no conjunctival injection   HENT: NCAT, mucous membranes moist   Neck: Supple, no thyromegaly, no lymphadenopathy, trachea midline   Respiratory: Clear to auscultation bilaterally, nonlabored respirations    Cardiovascular: RRR, no murmurs, rubs, or gallops, palpable pedal pulses bilaterally   Gastrointestinal: Positive bowel sounds, soft, nontender, nondistended   Musculoskeletal: No bilateral ankle edema, no clubbing or cyanosis to extremities   Psychiatric: Appropriate affect, cooperative   Neurologic: Oriented x 3, strength symmetric in all extremities, Cranial Nerves grossly intact to confrontation, speech clear   Skin: No rashes     Result Review    Result Review:  I have personally reviewed the results from the time of this admission to 2024 13:23 EDT and agree with these findings:  [x]  Laboratory list / accordion  [x]  Microbiology  [x]  Radiology  [x]  EKG/Telemetry   [x]  Cardiology/Vascular   []  Pathology  []  Old records  []  Other:        Assessment & Plan   Assessment / Plan       Active Hospital Problems:  Active Hospital Problems    Diagnosis     **Aphasia      Plan:      #Aphasia along with altered mental status, CVA to exclude  -Presented with dizziness altered mental status aphasia around 2 PM on May 17, 2024.  Lasted for 30 mins   -NIHSS 1 in the ED. He is back to baseline.   -CT head and CT angio  head and neck without any acute changes.  -MRI brain reviewed and echo pending  -Neurology following  -ASA and statin   -Fall precaution, seizure precaution  - PT/OT/ST     #Aortic issue ( ? Aneurysm )  -pt family mentioned there is large aorta and seems has to be managed asap, they got cardio appt   -cards consulted  -f/u CT angio chest      #Hypertension  -Resume home medication once appropriate.  Given CVA exclusion, permissive hypertension as of now.     # Hyperlipidemia  -On statin.  Lipid panel     #Mild dementia  -Frequent reorientation    DVT prophylaxis:  Mechanical DVT prophylaxis orders are present.        CODE STATUS:   Code Status (Patient has no pulse and is not breathing): CPR (Attempt to Resuscitate)  Medical Interventions (Patient has pulse or is breathing): Full Support    Disposition:  I expect patient to be discharged 2 days    Jayy Daley MD

## 2024-05-18 NOTE — PROGRESS NOTES
LOS: 1 day     Chief Complaint:  speech arrest w/paraphasic errors        SUBJECTIVE:    History taken from: patient chart family RN    Interval History: Navid Stearns was admitted on 5/17/2024 no acute events overnight.  No further episodes.  MRI brain was unremarkable.      - Portions of the above HPI were copied from previous encounters and edited as appropriate.    Patient Complaints: None      Review of Systems   Constitutional:  Negative for fever and unexpected weight change.   HENT:  Negative for ear pain, hearing loss, tinnitus, trouble swallowing and voice change.    Eyes:  Negative for photophobia and visual disturbance.   Respiratory:  Negative for chest tightness and shortness of breath.    Cardiovascular:  Negative for chest pain and palpitations.   Gastrointestinal:  Negative for nausea and vomiting.   Genitourinary:  Negative for difficulty urinating, dysuria, frequency and urgency.   Musculoskeletal:  Negative for back pain, gait problem, neck pain and neck stiffness.   Neurological:  Negative for dizziness, tremors, seizures, syncope, facial asymmetry, speech difficulty, weakness, light-headedness, numbness and headaches.   Psychiatric/Behavioral:  Negative for agitation, behavioral problems and confusion.    All other systems reviewed and are negative.         Pertinent PMH:  has a past medical history of Hyperlipidemia, Hypertension, Sleep apnea, and Stroke.   ________________________________________________     OBJECTIVE:      Neurologic Exam     Mental Status   Oriented to person, place, and time.   Oriented to person.   Oriented to place. Oriented to city.   Oriented to time. Oriented to year, month, date and day.   Registration: recalls 3 of 3 objects. Recall at 5 minutes: recalls 3 of 3 objects. Follows 3 step commands.   Attention: normal. Concentration: normal.   Speech: speech is normal   Level of consciousness: alert  Knowledge: good.   Able to name object. Able to read.  Able to repeat. Able to write. Normal comprehension.     Cranial Nerves   Cranial nerves II through XII intact.     CN II   Visual fields full to confrontation.   Visual acuity: normal    CN III, IV, VI   Pupils are equal, round, and reactive to light.  Extraocular motions are normal.     CN V   Facial sensation intact.     CN VII   Facial expression full, symmetric.     CN VIII   CN VIII normal.     CN IX, X   CN IX normal.   CN X normal.     CN XI   CN XI normal.     CN XII   CN XII normal.     Motor Exam   Muscle bulk: normal  Overall muscle tone: normal  Right arm pronator drift: absent  Left arm pronator drift: absent    Strength   Strength 5/5 throughout.     Sensory Exam   Light touch normal.   Vibration normal.   Proprioception normal.   Pinprick normal.     Gait, Coordination, and Reflexes     Reflexes   Right brachioradialis: 2+  Left brachioradialis: 2+  Right biceps: 2+  Left biceps: 2+  Right triceps: 2+  Left triceps: 2+  Right patellar: 2+  Left patellar: 2+  Right achilles: 2+  Left achilles: 2+  Right plantar: normal  Left plantar: normal  Right ankle clonus: absent  Left ankle clonus: absent        ________________________________________________   RESULTS REVIEW    VITAL SIGNS:  Temp:  [97.5 °F (36.4 °C)-98.6 °F (37 °C)] 97.6 °F (36.4 °C)  Heart Rate:  [46-63] 50  Resp:  [13-17] 16  BP: (116-157)/(59-76) 131/72    LABS:       Lab 05/18/24 0229 05/17/24  1616   WBC 8.42 7.99   HEMOGLOBIN 16.0 17.9*   HEMATOCRIT 46.9 51.1*   PLATELETS 192 223   NEUTROS ABS 4.09 4.34   IMMATURE GRANS (ABS) 0.01 0.01   LYMPHS ABS 3.25* 2.61   MONOS ABS 0.60 0.68   EOS ABS 0.40 0.28   MCV 97.1* 97.1*   PROTIME  --  10.9   APTT  --  30.3         Lab 05/18/24 0229 05/17/24  1616   SODIUM 145 143   POTASSIUM 3.3* 3.7   CHLORIDE 111* 106   CO2 25.0 25.0   ANION GAP 9.0 12.0   BUN 15 16   CREATININE 0.84 0.97   EGFR 97.4 87.2   GLUCOSE 88 112*   CALCIUM 9.0 9.1   MAGNESIUM 2.3  --    PHOSPHORUS 3.3  --    HEMOGLOBIN A1C   --  6.30*   TSH  --  1.520         Lab 05/17/24  1616   TOTAL PROTEIN 7.2   ALBUMIN 4.4   GLOBULIN 2.8   ALT (SGPT) 22   AST (SGOT) 18   BILIRUBIN 0.6   ALK PHOS 111         Lab 05/17/24  1616   HSTROP T <6   PROTIME 10.9   INR 1.00         Lab 05/17/24  1616   CHOLESTEROL 106   LDL CHOL 41   HDL CHOL 33*   TRIGLYCERIDES 196*         Lab 05/17/24  1653   ABO TYPING B   RH TYPING Positive   ANTIBODY SCREEN Negative             Lab Results   Component Value Date    TSH 1.520 05/17/2024    LDL 41 05/17/2024    HGBA1C 6.30 (H) 05/17/2024         IMAGING STUDIES:  MRI Brain Without Contrast    Result Date: 5/17/2024  Impression: 1. No acute ischemic change. 2. Remote infarct left parietal occipital region. 3. White matter changes compatible with small vessel ischemic disease in this age group without significant change given differences in technique to the comparison Electronically Signed: Lamont Turk MD  5/17/2024 8:22 PM EDT  Workstation ID: [id number]    XR Chest 1 View    Result Date: 5/17/2024  Impression: No acute cardiopulmonary abnormality. Electronically Signed: Jonathan Bonner MD  5/17/2024 5:13 PM EDT  Workstation ID: [id number]    CT Angiogram Head w AI Analysis of LVO    Result Date: 5/17/2024  Mild atheromatous disease. No vessel occlusion or severe stenosis. Electronically Signed: Tyrone Blank MD  5/17/2024 4:44 PM EDT  Workstation ID: [id number]    CT Angiogram Neck    Result Date: 5/17/2024  Mild atheromatous disease. No vessel occlusion or severe stenosis. Electronically Signed: Tyrone Blank MD  5/17/2024 4:44 PM EDT  Workstation ID: [id number]    CT Head Without Contrast Stroke Protocol    Result Date: 5/17/2024  1. No acute intracranial hemorrhage or mass effect 2. Remote infarct, area of encephalomalacia again noted left parietal occipital region Electronically Signed: Lamont Turk MD  5/17/2024 4:25 PM EDT  Workstation ID: [id number]     I reviewed the patient's new clinical  results.    ________________________________________________      PROBLEM LIST:    Aphasia        ASSESSMENT/PLAN:  \\aphasia with altered mental status and paraphasic errors \\     CTH negative for an acute intracranial process. Remote infarct, area of encephalomalacia noted in the left parietal occipital region.   CTA H/N negative   MRI Brain WO negative for acute ischemic stroke  EEG  ECHO w/bubble study  C/w ASA 81 qD PO  Lipitor 40 mg qHS  Lipid panel - pending   HgA1c - pending  Recommend cardiology consult for aortic enlargement? Family mentioned he had severely enlarged aorta that was identified earlier today. Primary team will obtain CT angio chest  Stroke panel  Recommend outpatient follow-up with her neurocognitive specialist either at [facility], [facility] or [facility].  Neurology will follow               Modification of stroke risk factors:   - Blood pressure should be less than 130/80 outpatient, HbA1c less than 6.5, LDL less than 70; b12>500 and smoking cessation if applicable. We would be grateful if the primary team / primary care physician would keep a close watch on the above targets.  - Stroke education  - Follow up with neurologist of choice       **Please refer to previous notes for further details and recommendations.     I discussed the patient's findings and my recommendations with patient, family, and nursing staff    Rudi Ramirez MD  05/18/24  14:43 EDT

## 2024-05-19 ENCOUNTER — APPOINTMENT (OUTPATIENT)
Dept: RESPIRATORY THERAPY | Facility: HOSPITAL | Age: 64
End: 2024-05-19
Payer: COMMERCIAL

## 2024-05-19 VITALS
TEMPERATURE: 96.9 F | DIASTOLIC BLOOD PRESSURE: 75 MMHG | HEART RATE: 51 BPM | OXYGEN SATURATION: 94 % | RESPIRATION RATE: 17 BRPM | BODY MASS INDEX: 24.85 KG/M2 | WEIGHT: 167.77 LBS | SYSTOLIC BLOOD PRESSURE: 132 MMHG | HEIGHT: 69 IN

## 2024-05-19 PROBLEM — E78.5 HYPERLIPIDEMIA: Status: ACTIVE | Noted: 2024-05-19

## 2024-05-19 PROBLEM — I10 ESSENTIAL HYPERTENSION: Status: ACTIVE | Noted: 2024-05-19

## 2024-05-19 PROBLEM — I71.9 AORTIC ANEURYSM: Status: ACTIVE | Noted: 2024-05-19

## 2024-05-19 LAB
ANION GAP SERPL CALCULATED.3IONS-SCNC: 10 MMOL/L (ref 5–15)
BASOPHILS # BLD AUTO: 0.06 10*3/MM3 (ref 0–0.2)
BASOPHILS NFR BLD AUTO: 0.8 % (ref 0–1.5)
BUN SERPL-MCNC: 20 MG/DL (ref 8–23)
BUN/CREAT SERPL: 21.1 (ref 7–25)
CALCIUM SPEC-SCNC: 8.6 MG/DL (ref 8.6–10.5)
CHLORIDE SERPL-SCNC: 108 MMOL/L (ref 98–107)
CO2 SERPL-SCNC: 23 MMOL/L (ref 22–29)
CREAT SERPL-MCNC: 0.95 MG/DL (ref 0.76–1.27)
DEPRECATED RDW RBC AUTO: 43.8 FL (ref 37–54)
EGFRCR SERPLBLD CKD-EPI 2021: 89.4 ML/MIN/1.73
EOSINOPHIL # BLD AUTO: 0.52 10*3/MM3 (ref 0–0.4)
EOSINOPHIL NFR BLD AUTO: 7.1 % (ref 0.3–6.2)
ERYTHROCYTE [DISTWIDTH] IN BLOOD BY AUTOMATED COUNT: 12.2 % (ref 12.3–15.4)
GLUCOSE SERPL-MCNC: 90 MG/DL (ref 65–99)
HCT VFR BLD AUTO: 44.8 % (ref 37.5–51)
HGB BLD-MCNC: 15.5 G/DL (ref 13–17.7)
IMM GRANULOCYTES # BLD AUTO: 0.01 10*3/MM3 (ref 0–0.05)
IMM GRANULOCYTES NFR BLD AUTO: 0.1 % (ref 0–0.5)
LYMPHOCYTES # BLD AUTO: 3.22 10*3/MM3 (ref 0.7–3.1)
LYMPHOCYTES NFR BLD AUTO: 43.8 % (ref 19.6–45.3)
MAGNESIUM SERPL-MCNC: 2.2 MG/DL (ref 1.6–2.4)
MCH RBC QN AUTO: 33.5 PG (ref 26.6–33)
MCHC RBC AUTO-ENTMCNC: 34.6 G/DL (ref 31.5–35.7)
MCV RBC AUTO: 96.8 FL (ref 79–97)
MONOCYTES # BLD AUTO: 0.51 10*3/MM3 (ref 0.1–0.9)
MONOCYTES NFR BLD AUTO: 6.9 % (ref 5–12)
NEUTROPHILS NFR BLD AUTO: 3.03 10*3/MM3 (ref 1.7–7)
NEUTROPHILS NFR BLD AUTO: 41.3 % (ref 42.7–76)
NRBC BLD AUTO-RTO: 0 /100 WBC (ref 0–0.2)
PHOSPHATE SERPL-MCNC: 2.9 MG/DL (ref 2.5–4.5)
PLATELET # BLD AUTO: 178 10*3/MM3 (ref 140–450)
PMV BLD AUTO: 10.8 FL (ref 6–12)
POTASSIUM SERPL-SCNC: 3.5 MMOL/L (ref 3.5–5.2)
RBC # BLD AUTO: 4.63 10*6/MM3 (ref 4.14–5.8)
SODIUM SERPL-SCNC: 141 MMOL/L (ref 136–145)
WBC NRBC COR # BLD AUTO: 7.35 10*3/MM3 (ref 3.4–10.8)

## 2024-05-19 PROCEDURE — 80048 BASIC METABOLIC PNL TOTAL CA: CPT | Performed by: INTERNAL MEDICINE

## 2024-05-19 PROCEDURE — G0378 HOSPITAL OBSERVATION PER HR: HCPCS

## 2024-05-19 PROCEDURE — 84100 ASSAY OF PHOSPHORUS: CPT | Performed by: INTERNAL MEDICINE

## 2024-05-19 PROCEDURE — 85025 COMPLETE CBC W/AUTO DIFF WBC: CPT | Performed by: INTERNAL MEDICINE

## 2024-05-19 PROCEDURE — 83735 ASSAY OF MAGNESIUM: CPT | Performed by: INTERNAL MEDICINE

## 2024-05-19 PROCEDURE — 97161 PT EVAL LOW COMPLEX 20 MIN: CPT

## 2024-05-19 PROCEDURE — 99214 OFFICE O/P EST MOD 30 MIN: CPT | Performed by: STUDENT IN AN ORGANIZED HEALTH CARE EDUCATION/TRAINING PROGRAM

## 2024-05-19 RX ORDER — POTASSIUM CHLORIDE 20 MEQ/1
40 TABLET, EXTENDED RELEASE ORAL EVERY 4 HOURS
Status: COMPLETED | OUTPATIENT
Start: 2024-05-19 | End: 2024-05-19

## 2024-05-19 RX ORDER — NICOTINE 21 MG/24HR
1 PATCH, TRANSDERMAL 24 HOURS TRANSDERMAL NIGHTLY
Qty: 30 PATCH | Refills: 0 | Status: SHIPPED | OUTPATIENT
Start: 2024-05-19

## 2024-05-19 RX ORDER — ATORVASTATIN CALCIUM 40 MG/1
40 TABLET, FILM COATED ORAL NIGHTLY
Qty: 90 TABLET | Refills: 0 | Status: SHIPPED | OUTPATIENT
Start: 2024-05-19

## 2024-05-19 RX ADMIN — POTASSIUM CHLORIDE 40 MEQ: 1500 TABLET, EXTENDED RELEASE ORAL at 08:01

## 2024-05-19 RX ADMIN — Medication 10 ML: at 08:03

## 2024-05-19 RX ADMIN — ASPIRIN 81 MG: 81 TABLET, COATED ORAL at 08:01

## 2024-05-19 RX ADMIN — POTASSIUM CHLORIDE 40 MEQ: 1500 TABLET, EXTENDED RELEASE ORAL at 10:53

## 2024-05-19 NOTE — DISCHARGE INSTR - APPOINTMENTS
Follow up with memory specialist Curtis Little M.D. @ [facility] [phone]  Follow up with neuropsychiatric Krish Dorsey MD, Ph.D. @ [place] [phone]  Call tomorrow to get appointment     MCOT need to be on for 15 days. Follow up within 30 days with Dr. Brown

## 2024-05-19 NOTE — CONSULTS
Cardiology Consult Note    Patient Identification:  Name: Navid Stearns  Age: 64 y.o.  Sex: male  :  1960  MRN: [id number]             Requesting Physician :  Jayy Daley MD     Reason for Consultation / Chief Complaint :   Rule out cardiac source of emboli    History of Present Illness:      Mr. Stearns is a 65 yo WM who does not routinely see a cardiologist. Pmh includes     # HTN  # dyslipidemia   # CVA- prior left PCA stroke on ASA  # dementia  # current smoker  # aortic dilation    Patient presented to ER with altered mental status and aphasia. Patient reportedly had an episode earlier in the day with confusion more so than normal and aphasia. He later went to the doctors office to review imaging done on his aorta and experienced another episode prompting ER visit.     Work up revealed relatively unremarkable labwork  CT of the head revealed negative for an acute intracranial process. Remote infarct, area of encephalomalacia noted in the left parietal occipital region.   CTA head and neck negative for acute findings.   MRI showed Remote infarct left parietal occipital region. No acute findings.   CT angiogram with and without of chest showed Dilatation of the ascending thoracic aorta measuring 4 cm.   Cardiology has been consulted for stroke work up and 4cm dilation of aorta.    Assessment:  :    Acute CVA  Dyslipidemia with low HDL  Hyperglycemia  Aortic enlargement  Prediabetes with A1c of 6.3  Hypertension  Cigarette smoker    Recommendations / Plan:          Patient presented with aphasia and acute CVA.  Echo is unrevealing.  Aorta is mildly dilated at 4.  Will monitor aortic size.  Will get 2-week M cot and follow-up as outpatient to rule out A-fib.  Continue guideline directed medical therapy with aspirin and statins as tolerated  Counseled on smoking cessation  Discussed with patient and family by bedside.  Discussed with RN taking care of patient to coordinate care            Diagnosis Plan   1. Confusion                   Past Medical History:  Past Medical History:   Diagnosis Date    Hyperlipidemia     Hypertension     Sleep apnea     Stroke      Past Surgical History:  Past Surgical History:   Procedure Laterality Date    COLONOSCOPY      HERNIA REPAIR        Allergies:  Allergies   Allergen Reactions    Penicillins Unknown - High Severity     Home Meds:  Medications Prior to Admission   Medication Sig Dispense Refill Last Dose    amLODIPine (NORVASC) 10 MG tablet Take 1 tablet by mouth Every Morning.   5/17/2024    aspirin 81 MG EC tablet Take 1 tablet by mouth Daily.   5/17/2024    atorvastatin (LIPITOR) 10 MG tablet Take 1 tablet by mouth Every Night.   5/16/2024    donepezil (ARICEPT) 5 MG tablet TAKE 1 TABLET BY MOUTH EVERY DAY AT NIGHT 90 tablet 1 5/16/2024    escitalopram (LEXAPRO) 5 MG tablet Take 1 tablet by mouth Every Night.   5/16/2024    multivitamin with minerals tablet tablet Take 1 tablet by mouth Daily.   5/17/2024    nebivolol (BYSTOLIC) 20 MG tablet Take 1 tablet by mouth Every Night.   5/16/2024     Current Meds:     Current Facility-Administered Medications:     acetaminophen (TYLENOL) tablet 650 mg, 650 mg, Oral, Q4H PRN, Jag Mosher MD    aluminum-magnesium hydroxide-simethicone (MAALOX MAX) 400-400-40 MG/5ML suspension 15 mL, 15 mL, Oral, Q6H PRN, Jag Mosher MD    [Held by provider] amLODIPine (NORVASC) tablet 10 mg, 10 mg, Oral, QAM, Jag Mosher MD    aspirin EC tablet 81 mg, 81 mg, Oral, Daily, Jag Mosher MD, 81 mg at 05/19/24 0801    atorvastatin (LIPITOR) tablet 40 mg, 40 mg, Oral, Nightly, Jag Mosher MD, 40 mg at 05/18/24 2035    sennosides-docusate (PERICOLACE) 8.6-50 MG per tablet 2 tablet, 2 tablet, Oral, BID PRN **AND** polyethylene glycol (MIRALAX) packet 17 g, 17 g, Oral, Daily PRN **AND** bisacodyl (DULCOLAX) EC tablet 5 mg, 5 mg, Oral, Daily PRN **AND** bisacodyl (DULCOLAX) suppository 10 mg, 10 mg, Rectal, Daily PRN, Jag Mosher MD     Calcium Replacement - Follow Nurse / BPA Driven Protocol, , Does not apply, Nomi HOWARD Yadana, MD    [Held by provider] clopidogrel (PLAVIX) tablet 75 mg, 75 mg, Oral, Daily, Jag Mosher MD    donepezil (ARICEPT) tablet 5 mg, 5 mg, Oral, Nightly, Jag Mosher MD, 5 mg at 05/18/24 2035    Magnesium Standard Dose Replacement - Follow Nurse / BPA Driven Protocol, , Does not apply, PRNomi ALFARO Yadana, MD    melatonin tablet 5 mg, 5 mg, Oral, Nightly PRNNomi Yadana, MD, 5 mg at 05/18/24 2035    [Held by provider] nebivolol (BYSTOLIC) tablet 20 mg, 20 mg, Oral, Nightly, Jag Mosher MD    nicotine (NICODERM CQ) 21 MG/24HR patch 1 patch, 1 patch, Transdermal, Nightly, Deanne Lemus APRN, 1 patch at 05/18/24 2035    nitroglycerin (NITROSTAT) SL tablet 0.4 mg, 0.4 mg, Sublingual, Q5 Min PRN, Jag Mosher MD    ondansetron ODT (ZOFRAN-ODT) disintegrating tablet 4 mg, 4 mg, Oral, Q6H PRN **OR** ondansetron (ZOFRAN) injection 4 mg, 4 mg, Intravenous, Q6H PRNomi ALFARO Yadana, MD    Phosphorus Replacement - Follow Nurse / BPA Driven Protocol, , Does not apply, PRNomi ALFARO Yadana, MD    potassium chloride (KLOR-CON M20) CR tablet 40 mEq, 40 mEq, Oral, Q4H, Jayy Dalye MD, 40 mEq at 05/19/24 0801    Potassium Replacement - Follow Nurse / BPA Driven Protocol, , Does not apply, Nomi HOWARD Yadana, MD    sodium chloride 0.9 % flush 10 mL, 10 mL, Intravenous, PRN, Dony Pastor MD    sodium chloride 0.9 % flush 10 mL, 10 mL, Intravenous, Q12H, Jag Mosher MD, 10 mL at 05/19/24 0803    sodium chloride 0.9 % flush 10 mL, 10 mL, Intravenous, Nomi HOWARD Yadana, MD    sodium chloride 0.9 % infusion 40 mL, 40 mL, IntravenousLEE Oo, Yadana, MD  Social History:   Social History     Tobacco Use    Smoking status: Every Day     Current packs/day: 2.00     Average packs/day: 2.0 packs/day for 45.0 years (90.0 ttl pk-yrs)     Types: Cigarettes    Smokeless tobacco: Never   Substance Use Topics    Alcohol use: Never      Family History:  Family History  "  Problem Relation Age of Onset    Cancer Mother     Alzheimer's disease Father         Review of Systems : Review of Systems   Constitutional: Negative for chills, diaphoresis and malaise/fatigue.   Cardiovascular:  Negative for chest pain, dyspnea on exertion, irregular heartbeat, leg swelling, near-syncope, orthopnea, palpitations, paroxysmal nocturnal dyspnea and syncope.   Respiratory:  Negative for cough, shortness of breath, sleep disturbances due to breathing and sputum production.    Gastrointestinal:  Negative for change in bowel habit.   Genitourinary:  Negative for urgency.   Neurological:  Negative for dizziness and headaches.        Aphasia   Psychiatric/Behavioral:  Negative for altered mental status.               Constitutional:  Temp:  [97.4 °F (36.3 °C)-97.9 °F (36.6 °C)] 97.9 °F (36.6 °C)  Heart Rate:  [50-67] 56  Resp:  [11-16] 14  BP: (123-157)/(59-95) 146/71    Physical Exam   /71 (BP Location: Right arm, Patient Position: Lying)   Pulse 56   Temp 97.9 °F (36.6 °C) (Oral)   Resp 14   Ht 175.3 cm (69.02\")   Wt 76.1 kg (167 lb 12.3 oz)   SpO2 94%   BMI 24.76 kg/m²   Physical Exam  General:  Appears in no acute distress  Eyes: Sclerae are anicteric,  conjunctivae are clear   HEENT:  No JVD. Thyroid not visibly enlarged. No mucosal pallor or cyanosis  Respiratory: Respirations regular and unlabored at rest.  Bilaterally good breath sounds with good air entry in all fields. No crackles, rubs or wheezes auscultated  Cardiovascular: S1,S2 Regular rate and rhythm. No murmur, rub or gallop auscultated. No pretibial pitting edema  Gastrointestinal: Abdomen soft, flat, nontender. Bowel sounds present.   Musculoskeletal:  No abnormal movements  Extremities: No digital clubbing or cyanosis  Skin: Color pink. Skin warm and dry to touch. No rashes  No xanthoma  Neuro: Alert and awake, no lateralizing deficits appreciated    Cardiographics  ECG: EKG tracing was  personally reviewed/interpreted by " me  ECG 12 Lead Stroke Evaluation   Final Result   HEART RATE= 62  bpm   RR Interval= 964  ms   HI Interval= 188  ms   P Horizontal Axis= 15  deg   P Front Axis= 56  deg   QRSD Interval= 110  ms   QT Interval= 418  ms   QTcB= 426  ms   QRS Axis= 50  deg   T Wave Axis= 55  deg   - NORMAL ECG -   Sinus rhythm   No previous ECG available for comparison   Electronically Signed By: Dony Pastor (Ramy) 18-May-2024 06:21:21   Date and Time of Study: 2024-05-17 16:33:35          Telemetry: Sinus rhythm / sinus bradycardia, HR 50-60s    Echocardiogram:   Results for orders placed during the hospital encounter of 05/17/24    Adult Transthoracic Echo Complete w/ Color, Spectral and Contrast if necessary per protocol    Interpretation Summary    Left ventricular systolic function is normal. Calculated left ventricular EF = 59%    Left ventricular diastolic function was normal.    Estimated right ventricular systolic pressure from tricuspid regurgitation is normal (<35 mmHg).    Conclusion      Normal LV size and contractility EF of 60 to 65%  Normal RV size  Normal atrial size  Pulmonic valve is not well visualized.  Aortic valve, mitral valve, tricuspid valve appears structurally normal trace tricuspid t regurgitation seen.  Calculated RV systolic pressure of 34 mmHg.  No pericardial effusion seen.  Proximal aorta appears normal in size.      Imaging  Chest X-ray:   Imaging Results (Last 24 Hours)       Procedure Component Value Units Date/Time    CT Angiogram Chest [[id number]] Collected: 05/18/24 1745     Updated: 05/18/24 1759    Narrative:      CT ANGIOGRAM CHEST    Date of Exam: 5/18/2024 5:42 PM EDT    Indication: aortic aneurysm.    Comparison: None available.    Technique: CTA of the chest was performed before and after the uneventful intravenous administration of iodinated contrast. Reconstructed coronal and sagittal images were also obtained. In addition, a 3-D volume rendered image was created for   interpretation.  Automated exposure control and iterative reconstruction methods were used.    Findings:    Cardiovascular: No pericardial effusion. Ascending thoracic aorta measures 4 cm in caliber. Normal caliber descending thoracic aorta measuring 2.7 cm. Mild mixed atheromatous disease of the thoracic aorta without acute process. No central pulmonary   embolism. No significant coronary artery calcifications.    Lymph nodes and mediastinum: No lymphadenopathy or mass.    Lungs and airways: Central airways are patent. Moderate centrilobular and paraseptal emphysema with left apical bulla. No suspicious pulmonary nodules or masses. No focal consolidation.    Pleura: No pleural effusion or pneumothorax.     Bones and soft tissues: No acute or suspicious osseous abnormality. Soft tissues are within normal limits.    Upper abdomen: Bilateral renal cysts. Duodenal diverticulum.      Impression:      Impression:    Dilatation of the ascending thoracic aorta measuring 4 cm.    Moderate emphysematous changes including left apical bulla.    No evidence of acute process in the chest.      Electronically Signed: Tani Davenport MD    5/18/2024 5:57 PM EDT    Workstation ID: [id number]            Lab Review: I have reviewed the labs  Results from last 7 days   Lab Units 05/17/24  1616   HSTROP T ng/L <6     Results from last 7 days   Lab Units 05/19/24  0334   MAGNESIUM mg/dL 2.2     Results from last 7 days   Lab Units 05/19/24  0334   SODIUM mmol/L 141   POTASSIUM mmol/L 3.5   BUN mg/dL 20   CREATININE mg/dL 0.95   CALCIUM mg/dL 8.6             Results from last 7 days   Lab Units 05/19/24  0334 05/18/24  0229 05/17/24  1616   WBC 10*3/mm3 7.35 8.42 7.99   HEMOGLOBIN g/dL 15.5 16.0 17.9*   HEMATOCRIT % 44.8 46.9 51.1*   PLATELETS 10*3/mm3 178 192 223     Results from last 7 days   Lab Units 05/17/24  1616   INR  1.00   APTT seconds 30.3             Farhad Brown MD  5/19/2024, 08:49 EDT      EMR Dragon/Transcription:   Dictated  utilizing Dragon dictation

## 2024-05-19 NOTE — THERAPY EVALUATION
Patient Name: Navid Stearns  : 1960    MRN: [id number]                              Today's Date: 2024       Admit Date: 2024    Visit Dx:     ICD-10-CM ICD-9-CM   1. Confusion  R41.0 298.9     Patient Active Problem List   Diagnosis    Aphasia     Past Medical History:   Diagnosis Date    Hyperlipidemia     Hypertension     Sleep apnea     Stroke      Past Surgical History:   Procedure Laterality Date    COLONOSCOPY      HERNIA REPAIR        General Information       Row Name 24 1018          Physical Therapy Time and Intention    Document Type evaluation  -CR     Mode of Treatment physical therapy  -CR       Row Name 24 1018          General Information    Patient Profile Reviewed yes  -CR     Prior Level of Function independent:;all household mobility;community mobility;yard work;ADL's  -CR     Existing Precautions/Restrictions no known precautions/restrictions  -CR     Barriers to Rehab none identified  -CR       Row Name 24 1018          Living Environment    People in Home spouse  -CR       Row Name 24 1018          Home Main Entrance    Number of Stairs, Main Entrance two  -CR       Row Name 24 1018          Stairs Within Home, Primary    Number of Stairs, Within Home, Primary none  -CR       Row Name 24 1018          Cognition    Orientation Status (Cognition) oriented x 4  -CR               User Key  (r) = Recorded By, (t) = Taken By, (c) = Cosigned By      Initials Name Provider Type    CR Reyes, Carmela, PT Physical Therapist                   Mobility       Row Name 24 1018          Bed Mobility    Bed Mobility bed mobility (all) activities  -CR     All Activities, [place] (Bed Mobility) independent  -CR       Row Name 24 1018          Bed-Chair Transfer    Bed-Chair [place] (Transfers) independent  -CR       Row Name 24 1018          Sit-Stand Transfer    Sit-Stand [place] (Transfers) independent  -CR        Row Name 05/19/24 1018          Gait/Stairs (Locomotion)    [place] Level (Gait) standby assist  -CR     Distance in Feet (Gait) 100  -CR               User Key  (r) = Recorded By, (t) = Taken By, (c) = Cosigned By      Initials Name Provider Type    CR Reyes, Carmela, PT Physical Therapist                   Obj/Interventions       Row Name 05/19/24 1018          Range of Motion Comprehensive    General Range of Motion no range of motion deficits identified  -CR       Row Name 05/19/24 1018          Strength Comprehensive (MMT)    General Manual Muscle Testing (MMT) Assessment no strength deficits identified  -CR       Row Name 05/19/24 1018          Balance    Balance Assessment sitting static balance;sitting dynamic balance;standing static balance;standing dynamic balance  -CR     Static Sitting Balance independent  -CR     Dynamic Sitting Balance independent  -CR     Position, Sitting Balance sitting edge of bed  -CR     Static Standing Balance independent  -CR     Dynamic Standing Balance independent  -CR       Row Name 05/19/24 1018          Sensory Assessment (Somatosensory)    Sensory Assessment (Somatosensory) sensation intact  -CR               User Key  (r) = Recorded By, (t) = Taken By, (c) = Cosigned By      Initials Name Provider Type    CR Reyes, Carmela, PT Physical Therapist                   Goals/Plan    No documentation.                  Clinical Impression       Row Name 05/19/24 1019          Pain    Pretreatment Pain Rating 0/10 - no pain  -CR       Row Name 05/19/24 1019          Plan of Care Review    Plan of Care Reviewed With patient;spouse  -CR     Outcome Evaluation 65 y/o male brought in hospital on 5/17 due to slurred speech, dizziness per family .Patient lives with spouse and normally independent, still mowing his yard. Patient remains independent with all aspects of mobility this evaluation. NO slurred speech, no disorientaion noted and no ROM/strength deficits. Patient is at his  baseline and does not need rehab intervention, should be safe to return home.  -CR       Row Name 05/19/24 1019          Therapy Assessment/Plan (PT)    Patient/Family Therapy Goals Statement (PT) home  -CR     Criteria for Skilled Interventions Met (PT) no;no problems identified which require skilled intervention  -CR     Therapy Frequency (PT) evaluation only  -CR               User Key  (r) = Recorded By, (t) = Taken By, (c) = Cosigned By      Initials Name Provider Type    CR Reyes, Carmela, PT Physical Therapist                   Outcome Measures       Row Name 05/19/24 1021 05/19/24 0100       How much help from another person do you currently need...    Turning from your back to your side while in flat bed without using bedrails? 4  -CR 4  -AC    Moving from lying on back to sitting on the side of a flat bed without bedrails? 4  -CR 4  -AC    Moving to and from a bed to a chair (including a wheelchair)? 4  -CR 4  -AC    Standing up from a chair using your arms (e.g., wheelchair, bedside chair)? 4  -CR 4  -AC    Climbing 3-5 steps with a railing? 4  -CR 4  -AC    To walk in hospital room? 4  -CR 4  -AC    AM-PAC 6 Clicks Score (PT) 24  -CR 24  -AC    Highest Level of Mobility Goal 8 --> Walked 250 feet or more  -CR 8 --> Walked 250 feet or more  -AC      Row Name 05/19/24 1021          Modified Marvin Scale    Pre-Stroke Modified [place] Scale 0 - No Symptoms at all.  -CR     Modified [place] Scale 0 - No Symptoms at all.  -CR       Row Name 05/19/24 1021          Functional Assessment    Outcome Measure Options Modified [place]  -CR               User Key  (r) = Recorded By, (t) = Taken By, (c) = Cosigned By      Initials Name Provider Type    CR Reyes, Carmela, PT Physical Therapist    Marleen Way RN Registered Nurse                                   PT Recommendation and Plan     Plan of Care Reviewed With: patient, spouse  Outcome Evaluation: 65 y/o male brought in hospital on 5/17 due to slurred  speech, dizziness per family .Patient lives with spouse and normally independent, still mowing his yard. Patient remains independent with all aspects of mobility this evaluation. NO slurred speech, no disorientaion noted and no ROM/strength deficits. Patient is at his baseline and does not need rehab intervention, should be safe to return home.     Time Calculation:         PT Charges       Row Name 05/19/24 1021             Time Calculation    Start Time 0921  -CR      Stop Time 0937  -CR      Time Calculation (min) 16 min  -CR      PT Received On 05/19/24  -CR         Time Calculation- PT    Total Timed Code Minutes- PT 0 minute(s)  -CR                User Key  (r) = Recorded By, (t) = Taken By, (c) = Cosigned By      Initials Name Provider Type    CR Reyes, Carmela, PT Physical Therapist                  Therapy Charges for Today       Code Description Service Date Service Provider Modifiers Qty    [id number]  PT EVAL LOW COMPLEXITY 3 5/19/2024 Reyes, Carmela, PT GP 1            PT G-Codes  Outcome Measure Options: Modified [place]  AM-PAC 6 Clicks Score (PT): 24  Modified [place] Scale: 0 - No Symptoms at all.  PT Discharge Summary  Anticipated Discharge Disposition (PT): home    Carmela Reyes, PT  5/19/2024

## 2024-05-19 NOTE — PLAN OF CARE
Goal Outcome Evaluation:  Plan of Care Reviewed With: patient       Pt ready to be DC. Education given, Cardiologist called to set up [facility] .

## 2024-05-19 NOTE — PLAN OF CARE
Goal Outcome Evaluation:  Plan of Care Reviewed With: patient, spouse           Outcome Evaluation: 65 y/o male brought in hospital on 5/17 due to slurred speech, dizziness per family .Patient lives with spouse and normally independent, still mowing his yard. Patient remains independent with all aspects of mobility this evaluation. NO slurred speech, no disorientaion noted and no ROM/strength deficits. Patient is at his baseline and does not need rehab intervention, should be safe to return home.      Anticipated Discharge Disposition (PT): home

## 2024-05-19 NOTE — DISCHARGE SUMMARY
[facility]         DISCHARGE SUMMARY    Patient Name: Navid Stearns  : 1960  MRN: [id number]    Date of Admission: 2024  Date of Discharge:  24  Primary Care Physician: Mary Lou Sykes MD    Consults       Date and Time Order Name Status Description    2024  5:23 PM Inpatient Neurology Consult Stroke Completed     2024  5:15 PM Hospitalist (on-call MD unless specified)      2024  4:37 PM Inpatient Cardiology Consult Completed     2024  4:13 PM Inpatient Neurology Consult Stroke Completed     2024  4:13 PM Inpatient Neurology Consult Stroke Completed             Presenting Problem:   Aphasia [R47.01]  Confusion [R41.0]    Active and Resolved Hospital Problems:  Active Hospital Problems    Diagnosis POA    **Aphasia [R47.01] Yes    Essential hypertension [I10] Yes    Aortic aneurysm [I71.9] Yes    Hyperlipidemia [E78.5] Yes   Tobacco abuse  Mild dementia   Resolved Hospital Problems   No resolved problems to display.         Hospital Course     Hospital Course:  Navid Stearns is a 64 y.o. male with PMH of hypertension hyperlipidemia CVA mild dementia presents to the hospital with complaints of altered mental status with aphasia around 2 PM today. Dizziness present. Lasting for 30 mins only. NIHSS 1 in the ED. CT head CT angio head and neck without any acute changes. Neurology was consulted and MRI brain and echo were done that did not show any acute findings. Family did report aortic aneurysm and CT angio chest was done that showed 4 cm in size and he will f/u as outpatient with CT surgery/cards for this. Neurology recommended EEG but patient refused. He was continued on ASA and increased statin dose. He was counseled on smoking cessation and given nicotine patches. Cardiology was consulted and recommended MCOT on discharge and to f/u as outpatient. His BP meds were held but can be resumed on discharge. His aphasia resolved and he is back  to mentation baseline now per family and patient.   Both cardiology and neurology cleared patient for discharge.         DISCHARGE Follow Up Recommendations for labs and diagnostics: cardiology, pcp and neurology in 1 week      Day of Discharge     Vital Signs:  Temp:  [97.4 °F (36.3 °C)-97.9 °F (36.6 °C)] 97.9 °F (36.6 °C)  Heart Rate:  [50-67] 56  Resp:  [11-16] 14  BP: (123-157)/(59-95) 146/71  Physical Exam:  Constitutional: Awake, alert   Eyes: PERRLA, sclerae anicteric, no conjunctival injection   HENT: NCAT, mucous membranes moist   Neck: Supple, no thyromegaly, no lymphadenopathy, trachea midline   Respiratory: Clear to auscultation bilaterally, nonlabored respirations    Cardiovascular: RRR, no murmurs, rubs, or gallops, palpable pedal pulses bilaterally   Gastrointestinal: Positive bowel sounds, soft, nontender, nondistended   Musculoskeletal: No bilateral ankle edema, no clubbing or cyanosis to extremities   Psychiatric: Appropriate affect, cooperative   Neurologic: Oriented x 3, strength symmetric in all extremities, Cranial Nerves grossly intact to confrontation, speech clear   Skin: No rashes     Pertinent  and/or Most Recent Results     LAB RESULTS:      Lab 05/19/24 0334 05/18/24 0229 05/17/24 1616   WBC 7.35 8.42 7.99   HEMOGLOBIN 15.5 16.0 17.9*   HEMATOCRIT 44.8 46.9 51.1*   PLATELETS 178 192 223   NEUTROS ABS 3.03 4.09 4.34   IMMATURE GRANS (ABS) 0.01 0.01 0.01   LYMPHS ABS 3.22* 3.25* 2.61   MONOS ABS 0.51 0.60 0.68   EOS ABS 0.52* 0.40 0.28   MCV 96.8 97.1* 97.1*   PROTIME  --   --  10.9   APTT  --   --  30.3         Lab 05/19/24  0334 05/18/24  1611 05/18/24 0229 05/17/24  1616   SODIUM 141  --  145 143   POTASSIUM 3.5 4.0 3.3* 3.7   CHLORIDE 108*  --  111* 106   CO2 23.0  --  25.0 25.0   ANION GAP 10.0  --  9.0 12.0   BUN 20  --  15 16   CREATININE 0.95  --  0.84 0.97   EGFR 89.4  --  97.4 87.2   GLUCOSE 90  --  88 112*   CALCIUM 8.6  --  9.0 9.1   MAGNESIUM 2.2  --  2.3  --    PHOSPHORUS  2.9  --  3.3  --    HEMOGLOBIN A1C  --   --   --  6.30*   TSH  --   --   --  1.520         Lab 05/17/24  1616   TOTAL PROTEIN 7.2   ALBUMIN 4.4   GLOBULIN 2.8   ALT (SGPT) 22   AST (SGOT) 18   BILIRUBIN 0.6   ALK PHOS 111         Lab 05/17/24  1616   HSTROP T <6   PROTIME 10.9   INR 1.00         Lab 05/17/24  1616   CHOLESTEROL 106   LDL CHOL 41   HDL CHOL 33*   TRIGLYCERIDES 196*         Lab 05/17/24  1653   ABO TYPING B   RH TYPING Positive   ANTIBODY SCREEN Negative         Brief Urine Lab Results       None          Microbiology Results (last 10 days)       ** No results found for the last 240 hours. **            CT Angiogram Chest    Result Date: 5/18/2024  Impression: Impression: Dilatation of the ascending thoracic aorta measuring 4 cm. Moderate emphysematous changes including left apical bulla. No evidence of acute process in the chest. Electronically Signed: Tani Davenport MD  5/18/2024 5:57 PM EDT  Workstation ID: [id number]    MRI Brain Without Contrast    Result Date: 5/17/2024  Impression: Impression: 1. No acute ischemic change. 2. Remote infarct left parietal occipital region. 3. White matter changes compatible with small vessel ischemic disease in this age group without significant change given differences in technique to the comparison Electronically Signed: Lamont Turk MD  5/17/2024 8:22 PM EDT  Workstation ID: [id number]    XR Chest 1 View    Result Date: 5/17/2024  Impression: Impression: No acute cardiopulmonary abnormality. Electronically Signed: Jonathan Bonner MD  5/17/2024 5:13 PM EDT  Workstation ID: [id number]    CT Angiogram Head w AI Analysis of LVO    Result Date: 5/17/2024  Impression: Mild atheromatous disease. No vessel occlusion or severe stenosis. Electronically Signed: Tyrone Blank MD  5/17/2024 4:44 PM EDT  Workstation ID: [id number]    CT Angiogram Neck    Result Date: 5/17/2024  Impression: Mild atheromatous disease. No vessel occlusion or severe stenosis. Electronically Signed:  Tyrone Blank MD  5/17/2024 4:44 PM EDT  Workstation ID: [id number]    CT Head Without Contrast Stroke Protocol    Result Date: 5/17/2024  Impression: 1. No acute intracranial hemorrhage or mass effect 2. Remote infarct, area of encephalomalacia again noted left parietal occipital region Electronically Signed: Lamont Turk MD  5/17/2024 4:25 PM EDT  Workstation ID: [id number]     Results for orders placed during the hospital encounter of 03/08/24    Duplex Carotid Ultrasound CAR    Interpretation Summary    Trivial hypoechoic plaque proximal right internal carotid artery with less than 50% ICA stenosis.    Mild atherosclerotic plaque proximal left internal carotid artery with less than 50% ICA stenosis.      Results for orders placed during the hospital encounter of 03/08/24    Duplex Carotid Ultrasound CAR    Interpretation Summary    Trivial hypoechoic plaque proximal right internal carotid artery with less than 50% ICA stenosis.    Mild atherosclerotic plaque proximal left internal carotid artery with less than 50% ICA stenosis.      Results for orders placed during the hospital encounter of 05/17/24    Adult Transthoracic Echo Complete w/ Color, Spectral and Contrast if necessary per protocol    Interpretation Summary    Left ventricular systolic function is normal. Calculated left ventricular EF = 59%    Left ventricular diastolic function was normal.    Estimated right ventricular systolic pressure from tricuspid regurgitation is normal (<35 mmHg).    Conclusion      Normal LV size and contractility EF of 60 to 65%  Normal RV size  Normal atrial size  Pulmonic valve is not well visualized.  Aortic valve, mitral valve, tricuspid valve appears structurally normal trace tricuspid t regurgitation seen.  Calculated RV systolic pressure of 34 mmHg.  No pericardial effusion seen.  Proximal aorta appears normal in size.      Labs Pending at Discharge: none        Discharge Details        Discharge Medications         New Medications        Instructions Start Date   nicotine 21 MG/24HR patch  Commonly known as: NICODERM CQ   1 patch, Transdermal, Nightly             Changes to Medications        Instructions Start Date   atorvastatin 40 MG tablet  Commonly known as: LIPITOR  What changed:   medication strength  how much to take   40 mg, Oral, Nightly             Continue These Medications        Instructions Start Date   amLODIPine 10 MG tablet  Commonly known as: NORVASC   10 mg, Oral, Every Morning      aspirin 81 MG EC tablet   81 mg, Oral, Daily      donepezil 5 MG tablet  Commonly known as: ARICEPT   5 mg, Oral, Every Night at Bedtime      escitalopram 5 MG tablet  Commonly known as: LEXAPRO   1 tablet, Oral, Nightly      multivitamin with minerals tablet tablet   1 tablet, Oral, Daily      nebivolol 20 MG tablet  Commonly known as: BYSTOLIC   1 tablet, Oral, Nightly               Allergies   Allergen Reactions    Penicillins Unknown - High Severity         Discharge Disposition:   Home or Self Care    Discharge Condition: stable    Diet:  Hospital:  Diet Order   Procedures    Diet: Cardiac; Healthy Heart (2-3 Na+); Fluid Consistency: Thin (IDDSI 0)         Discharge Activity: as tolerated        CODE STATUS:  Code Status and Medical Interventions:   Ordered at: 05/17/24 2023     Code Status (Patient has no pulse and is not breathing):    CPR (Attempt to Resuscitate)     Medical Interventions (Patient has pulse or is breathing):    Full Support         Future Appointments   Date Time Provider Department Center   5/22/2024  3:00 PM Mo Jackson MD [facility]   6/25/2024  8:00 AM Judith Banks MD [facility]   12/10/2024  3:45 PM Seipel, Joseph F, MD [facility]           Time spent on Discharge including face to face service:  35 minutes    Jayy Daley MD

## 2024-05-19 NOTE — PROGRESS NOTES
LOS: 1 day     Chief Complaint:  speech arrest w/paraphasic errors        SUBJECTIVE:    History taken from: patient chart family RN    Interval History: Navid Stearns was admitted on 5/17/2024 no acute events overnight.  No further episodes.  The patient is accompanied by his spouse today and has expressed that he no longer wants to stay for an EEG and would like to go outpatient despite several attempts to have them reconsider.    - Portions of the above HPI were copied from previous encounters and edited as appropriate.    Patient Complaints: None      Review of Systems   Constitutional:  Negative for fever and unexpected weight change.   HENT:  Negative for ear pain, hearing loss, tinnitus, trouble swallowing and voice change.    Eyes:  Negative for photophobia and visual disturbance.   Respiratory:  Negative for chest tightness and shortness of breath.    Cardiovascular:  Negative for chest pain and palpitations.   Gastrointestinal:  Negative for nausea and vomiting.   Genitourinary:  Negative for difficulty urinating, dysuria, frequency and urgency.   Musculoskeletal:  Negative for back pain, gait problem, neck pain and neck stiffness.   Neurological:  Negative for dizziness, tremors, seizures, syncope, facial asymmetry, speech difficulty, weakness, light-headedness, numbness and headaches.   Psychiatric/Behavioral:  Negative for agitation, behavioral problems and confusion.    All other systems reviewed and are negative.         Pertinent PMH:  has a past medical history of Hyperlipidemia, Hypertension, Sleep apnea, and Stroke.   ________________________________________________     OBJECTIVE:      Neurologic Exam     Mental Status   Oriented to person, place, and time.   Oriented to person.   Oriented to place. Oriented to city.   Oriented to time. Oriented to year, month, date and day.   Registration: recalls 3 of 3 objects. Recall at 5 minutes: recalls 3 of 3 objects. Follows 3 step commands.    Attention: normal. Concentration: normal.   Speech: speech is normal   Level of consciousness: alert  Knowledge: good.   Able to name object. Able to read. Able to repeat. Able to write. Normal comprehension.     Cranial Nerves   Cranial nerves II through XII intact.     CN II   Visual fields full to confrontation.   Visual acuity: normal    CN III, IV, VI   Pupils are equal, round, and reactive to light.  Extraocular motions are normal.     CN V   Facial sensation intact.     CN VII   Facial expression full, symmetric.     CN VIII   CN VIII normal.     CN IX, X   CN IX normal.   CN X normal.     CN XI   CN XI normal.     CN XII   CN XII normal.     Motor Exam   Muscle bulk: normal  Overall muscle tone: normal  Right arm pronator drift: absent  Left arm pronator drift: absent    Strength   Strength 5/5 throughout.     Sensory Exam   Light touch normal.   Vibration normal.   Proprioception normal.   Pinprick normal.     Gait, Coordination, and Reflexes     Reflexes   Right brachioradialis: 2+  Left brachioradialis: 2+  Right biceps: 2+  Left biceps: 2+  Right triceps: 2+  Left triceps: 2+  Right patellar: 2+  Left patellar: 2+  Right achilles: 2+  Left achilles: 2+  Right plantar: normal  Left plantar: normal  Right ankle clonus: absent  Left ankle clonus: absent        ________________________________________________   RESULTS REVIEW    VITAL SIGNS:  Temp:  [96.9 °F (36.1 °C)-97.9 °F (36.6 °C)] 96.9 °F (36.1 °C)  Heart Rate:  [50-67] 51  Resp:  [11-17] 17  BP: (123-149)/(64-95) 132/75    LABS:       Lab 05/19/24  0334 05/18/24  0229 05/17/24  1616   WBC 7.35 8.42 7.99   HEMOGLOBIN 15.5 16.0 17.9*   HEMATOCRIT 44.8 46.9 51.1*   PLATELETS 178 192 223   NEUTROS ABS 3.03 4.09 4.34   IMMATURE GRANS (ABS) 0.01 0.01 0.01   LYMPHS ABS 3.22* 3.25* 2.61   MONOS ABS 0.51 0.60 0.68   EOS ABS 0.52* 0.40 0.28   MCV 96.8 97.1* 97.1*   PROTIME  --   --  10.9   APTT  --   --  30.3         Lab 05/19/24  0334 05/18/24  2352  05/18/24  0229 05/17/24  1616   SODIUM 141  --  145 143   POTASSIUM 3.5 4.0 3.3* 3.7   CHLORIDE 108*  --  111* 106   CO2 23.0  --  25.0 25.0   ANION GAP 10.0  --  9.0 12.0   BUN 20  --  15 16   CREATININE 0.95  --  0.84 0.97   EGFR 89.4  --  97.4 87.2   GLUCOSE 90  --  88 112*   CALCIUM 8.6  --  9.0 9.1   MAGNESIUM 2.2  --  2.3  --    PHOSPHORUS 2.9  --  3.3  --    HEMOGLOBIN A1C  --   --   --  6.30*   TSH  --   --   --  1.520         Lab 05/17/24  1616   TOTAL PROTEIN 7.2   ALBUMIN 4.4   GLOBULIN 2.8   ALT (SGPT) 22   AST (SGOT) 18   BILIRUBIN 0.6   ALK PHOS 111         Lab 05/17/24  1616   HSTROP T <6   PROTIME 10.9   INR 1.00         Lab 05/17/24  1616   CHOLESTEROL 106   LDL CHOL 41   HDL CHOL 33*   TRIGLYCERIDES 196*         Lab 05/17/24  1653   ABO TYPING B   RH TYPING Positive   ANTIBODY SCREEN Negative             Lab Results   Component Value Date    TSH 1.520 05/17/2024    LDL 41 05/17/2024    HGBA1C 6.30 (H) 05/17/2024         IMAGING STUDIES:  CT Angiogram Chest    Result Date: 5/18/2024  Impression: Dilatation of the ascending thoracic aorta measuring 4 cm. Moderate emphysematous changes including left apical bulla. No evidence of acute process in the chest. Electronically Signed: Tani Davenport MD  5/18/2024 5:57 PM EDT  Workstation ID: [id number]    MRI Brain Without Contrast    Result Date: 5/17/2024  Impression: 1. No acute ischemic change. 2. Remote infarct left parietal occipital region. 3. White matter changes compatible with small vessel ischemic disease in this age group without significant change given differences in technique to the comparison Electronically Signed: Lamont Turk MD  5/17/2024 8:22 PM EDT  Workstation ID: [id number]    XR Chest 1 View    Result Date: 5/17/2024  Impression: No acute cardiopulmonary abnormality. Electronically Signed: Jonathan Bonner MD  5/17/2024 5:13 PM EDT  Workstation ID: [id number]    CT Angiogram Head w AI Analysis of LVO    Result Date: 5/17/2024  Mild  atheromatous disease. No vessel occlusion or severe stenosis. Electronically Signed: Tyrone Blank MD  5/17/2024 4:44 PM EDT  Workstation ID: [id number]    CT Angiogram Neck    Result Date: 5/17/2024  Mild atheromatous disease. No vessel occlusion or severe stenosis. Electronically Signed: Tyrone Blank MD  5/17/2024 4:44 PM EDT  Workstation ID: [id number]    CT Head Without Contrast Stroke Protocol    Result Date: 5/17/2024  1. No acute intracranial hemorrhage or mass effect 2. Remote infarct, area of encephalomalacia again noted left parietal occipital region Electronically Signed: Lamont Turk MD  5/17/2024 4:25 PM EDT  Workstation ID: [id number]     I reviewed the patient's new clinical results.    ________________________________________________      PROBLEM LIST:    Aphasia    Essential hypertension    Aortic aneurysm    Hyperlipidemia        ASSESSMENT/PLAN:  \\aphasia with altered mental status and paraphasic errors \\     CTH negative for an acute intracranial process. Remote infarct, area of encephalomalacia noted in the left parietal occipital region.   CTA H/N negative   MRI Brain WO negative for acute ischemic stroke  EEG -patient refused EEG inpatient  ECHO w/bubble study  C/w ASA 81 qD PO  Lipitor 40 mg qHS  Lipid panel - pending   HgA1c - pending  Patient had an enlarged ascending aorta on MRI chest and cardiology saw the patient and recommends following up outpatient with him.  Stroke panel  Recommend outpatient follow-up with her neurocognitive specialist either at [facility] or [facility].  Additionally the patient would benefit from a neuropsychiatric evaluation.  Krish Dorsey MD, Ph.D. @ [place]   Curtis Little M.D. @ [facility]   I discussed extensively seizure precautions with the patient and his wife. All questions answered to their satisfaction.   Follow up with neurologist of choice      SEIZURE INSTRUCTIONS:     Recommended to observe all  seizure precautions, including, but not limited to no driving until seizure free for more than 3 months- as per State driving regulation / law;   Avoid all high-risk activity,   Take showers instead of baths,   Avoid swimming without observation,   Avoid open heat sources,   Avoid working at heights and   Avoid engaging in all potentially hazardous activities.   Patient expressed clear understanding     -        **Please refer to previous notes for further details and recommendations.     I discussed the patient's findings and my recommendations with patient, family, and nursing staff    Rudi Ramirez MD  05/19/24  12:30 EDT

## 2024-05-19 NOTE — NURSING NOTE
Patient refused the EEG test . Contacted and informed Dr. Ramirez the patient's will. Dr. Ramirez is okay to DC the order and sign off at this point

## 2024-05-30 ENCOUNTER — TELEPHONE (OUTPATIENT)
Dept: CARDIOLOGY | Facility: CLINIC | Age: 64
End: 2024-05-30
Payer: COMMERCIAL

## 2024-05-30 NOTE — TELEPHONE ENCOUNTER
I spoke with Navid Stearns's wife, Rosalind,  and updated her on results from provider.  She verbalized understanding and has no further questions at this time.    Thank you,    Sapna DEGROOT RN  Triage [facility]  05/30/24 10:30 EDT

## 2024-06-25 ENCOUNTER — TELEPHONE (OUTPATIENT)
Dept: CARDIOLOGY | Facility: CLINIC | Age: 64
End: 2024-06-25

## 2024-06-25 ENCOUNTER — OFFICE VISIT (OUTPATIENT)
Dept: CARDIOLOGY | Facility: CLINIC | Age: 64
End: 2024-06-25
Payer: COMMERCIAL

## 2024-06-25 VITALS
HEART RATE: 49 BPM | BODY MASS INDEX: 26.22 KG/M2 | WEIGHT: 177 LBS | DIASTOLIC BLOOD PRESSURE: 82 MMHG | SYSTOLIC BLOOD PRESSURE: 130 MMHG | HEIGHT: 69 IN

## 2024-06-25 DIAGNOSIS — Z72.0 TOBACCO USE: ICD-10-CM

## 2024-06-25 DIAGNOSIS — I77.810 ASCENDING AORTA DILATATION: Primary | ICD-10-CM

## 2024-06-25 DIAGNOSIS — I10 ESSENTIAL HYPERTENSION: ICD-10-CM

## 2024-06-25 DIAGNOSIS — E78.2 MIXED HYPERLIPIDEMIA: ICD-10-CM

## 2024-06-25 DIAGNOSIS — I63.9 CEREBROVASCULAR ACCIDENT (CVA), UNSPECIFIED MECHANISM: ICD-10-CM

## 2024-06-25 DIAGNOSIS — J43.2 CENTRILOBULAR EMPHYSEMA: ICD-10-CM

## 2024-06-25 RX ORDER — MELATONIN
1000 DAILY
COMMUNITY

## 2024-06-25 RX ORDER — ASPIRIN 81 MG/1
81 TABLET ORAL DAILY
Qty: 90 TABLET | Refills: 3 | Status: SHIPPED | OUTPATIENT
Start: 2024-06-25

## 2024-06-25 RX ORDER — CLOPIDOGREL BISULFATE 75 MG/1
75 TABLET ORAL DAILY
COMMUNITY

## 2024-06-25 NOTE — TELEPHONE ENCOUNTER
Called and left a VM. Will continue to try to reach pt.    Ameena Castano, RN  Triage [facility]  06/25/24 09:54 EDT

## 2024-06-25 NOTE — TELEPHONE ENCOUNTER
Please call and let him know that we did receive the second monitor that he wore and it was normal-appearing.

## 2024-06-25 NOTE — PROGRESS NOTES
Date of Office Visit: 2024  Encounter Provider: Judith Banks MD  Place of Service: [facility] CARDIOLOGY  Patient Name: Navid Stearns  :1960      Patient ID:  Navid Stearns is a 64 y.o. male is here for recent stroke and cardiac risks.          History of Present Illness    He has a history of hypertension, hyperlipidemia, dilated ascending aorta, COPD, POLY, stroke (2024) and tobacco use.    He is , is 1 child, is retired , smokes, uses no drugs, has 4 cups of coffee per day.  His mom had cancer and his dad also had strokes.    Echocardiogram done 3/8/2024 shows ejection fraction 56-60% grade 1 diastolic dysfunction, normal saline study, severe atrial enlargement, moderate mitral insufficiency, aneurysmal dilation of the ascending aorta.  Carotid duplex study done 3/8/2024 showed less than 50% bilateral internal carotid artery stenosis.    Patient was admitted  - 2024 at [facility] with altered mental status and aphasia which lasted for 30 minutes and resolved.  He was diagnosed with an acute stroke.  Labs on 2024 show potassium 3.5, otherwise normal BMP, normal CBC. Lipids on 2024 show HDL 33, total cholesterol 106, LDL 41, VLDL 32, triglycerides 136, normal TSH, hemoglobin A1c 6.3%.  Echo done 2024 shows ejection fraction 59% with normal RVSP, normal diastolic function, normal valvular structure and function, normal RVSP.   CT head without contrast done 2024 for suspected acute stroke showed remote infarct in the left parietal occipital region with no acute intracranial hemorrhage or mass effect.  CTA head and neck done 2024 showed no occlusion of the vessels and no severe stenosis, mild atheromatous disease of the aortic arch with moderate atheromatous disease of the right internal carotid artery, mild atheromatous disease of the left common carotid artery with moderate atheromatous  disease of the intracranial segments of the left internal carotid artery, patent vertebral arteries, emphysema of the left lung apex.  CTA of the chest on 5/17/2024 showed the ascending thoracic aorta measuring 4 cm, moderate centrilobular and paraseptal emphysema with left apical bulla, bilateral renal cysts with duodenal diverticulum.  I did review the CTA images which show calcification in the proximal to mid LAD with patent left main, plate and LAD, patent ramus, patent circumflex, patent proximal RCA with mid RCA calcification noted.  MCOT monitor done 5/19/2024, patient monitored for 1 day and 5 hours showed sinus rhythm, first-degree AV block, Mobitz type I Wenckebach second-degree AV block noted, no symptoms noted on the diary.  He was discharged on aspirin and Plavix and recommended smoking cessation.    He has some short-term memory loss.  He enjoys mowing and mows all of his neighbors lawns.  He has no orthopnea or PND.  He does not feels heart racing or skipping.  He has no dizziness, syncope or falls.  His exertional chest tightness or pressure.  He has mild exertional dyspnea.  He takes his medications as directed without difficulty but stopped aspirin due to excessive bruising and bleeding.  He continues to smoke 1 pack to half pack of cigarettes a day.    Addendum: His second Zio patch monitor from [facility] which was worn for 7 days on 5/29/2024 showed less than 1% PACs and PVCs, no ventricular tachycardia, second-degree AV block Mobitz type I, no atrial fibrillation.    Past Medical History:   Diagnosis Date    Hyperlipidemia     Hypertension     Sleep apnea     Stroke          Past Surgical History:   Procedure Laterality Date    COLONOSCOPY      HERNIA REPAIR         Current Outpatient Medications on File Prior to Visit   Medication Sig Dispense Refill    amLODIPine (NORVASC) 10 MG tablet Take 1 tablet by mouth Every Morning.      atorvastatin (LIPITOR) 40 MG tablet Take 1 tablet by mouth  "Every Night. 90 tablet 0    Cholecalciferol 25 MCG (1000 UT) tablet Take 1 tablet by mouth Daily.      clopidogrel (PLAVIX) 75 MG tablet Take 1 tablet by mouth Daily.      donepezil (ARICEPT) 5 MG tablet TAKE 1 TABLET BY MOUTH EVERY DAY AT NIGHT 90 tablet 1    escitalopram (LEXAPRO) 5 MG tablet Take 1 tablet by mouth Every Night.      multivitamin with minerals tablet tablet Take 1 tablet by mouth Daily.      nebivolol (BYSTOLIC) 20 MG tablet Take 1 tablet by mouth Every Night.      [DISCONTINUED] aspirin 81 MG EC tablet Take 1 tablet by mouth Daily.      [DISCONTINUED] nicotine (NICODERM CQ) 21 MG/24HR patch Place 1 patch on the skin as directed by provider Every Night. 30 patch 0     No current facility-administered medications on file prior to visit.       Social History     Socioeconomic History    Marital status:    Tobacco Use    Smoking status: Every Day     Current packs/day: 2.00     Average packs/day: 2.0 packs/day for 45.0 years (90.0 ttl pk-yrs)     Types: Cigarettes    Smokeless tobacco: Never   Vaping Use    Vaping status: Never Used   Substance and Sexual Activity    Alcohol use: Not Currently    Drug use: Never    Sexual activity: Defer             Procedures    ECG 12 Lead    Date/Time: 6/25/2024 8:25 AM  Performed by: Judith Banks MD    Authorized by: Judith Banks MD  Comparison: compared with previous ECG   Similar to previous ECG  Rhythm: sinus rhythm  T inversion: aVL  Other findings: poor R wave progression    Clinical impression: non-specific ECG              Objective:      Vitals:    06/25/24 0805   BP: 130/82   Pulse: (!) 49   Weight: 80.3 kg (177 lb)   Height: 175.3 cm (69.02\")     Body mass index is 26.12 kg/m².    Vitals reviewed.   Constitutional:       General: Not in acute distress.     Appearance: Not diaphoretic.   Neck:      Vascular: No carotid bruit or JVD.   Pulmonary:      Effort: Pulmonary effort is normal.      Breath sounds: Decreased breath sounds " present.   Cardiovascular:      Normal rate. Regular rhythm.      Murmurs: There is no murmur.      No gallop.  No rub.   Pulses:     Intact distal pulses.      Carotid: 2+ bilaterally.     Radial: 2+ bilaterally.     Dorsalis pedis: 2+ bilaterally.     Posterior tibial: 2+ bilaterally.  Edema:     Peripheral edema absent.   Neurological:      Cranial Nerves: No cranial nerve deficit.         Lab Review:       Assessment:      Diagnosis Plan   1. Ascending aorta dilatation  CT Angiogram Chest      2. Mixed hyperlipidemia        3. Essential hypertension        4. Cerebrovascular accident (CVA), unspecified mechanism        5. Tobacco use  Ambulatory Referral to Pulmonology      6. Centrilobular emphysema  Ambulatory Referral to Pulmonology        CVA 5/2024, remain on clopidogrel, start aspirin.  Will try to get monitor that was done at [facility] looking for atrial fibrillation.  Will see Dr. Epley from neurology in August.  He does have short-term memory loss.  Ascending aorta dilation, recheck ascending aorta 1 year  Hypertension, goal <120/80, remain on amlodipine and Bystolic  Hyperlipidemia, on atorvastatin  Tobacco use, advised cessation, wife says she will try to get him to start nicotine patches.  Atherosclerosis noted of the carotid arteries  Coronary artery calcification noted on CTA chest 5/2024  COPD, refer to pulmonary     Plan:       See APRN in 1 year with a repeat CTA chest to measure the aorta right before that visit.  Restart aspirin 81 mg daily.  No other medication changes no other testing at this time.

## 2024-06-25 NOTE — TELEPHONE ENCOUNTER
Pt's wife called back. Went over results. She verbalized understanding.    Thank you,    Ameena Castano, RN  Triage [facility] [place]  06/25/24 10:14 EDT

## 2025-03-14 ENCOUNTER — TELEPHONE (OUTPATIENT)
Dept: CARDIOLOGY | Facility: CLINIC | Age: 65
End: 2025-03-14
Payer: COMMERCIAL

## 2025-03-14 NOTE — TELEPHONE ENCOUNTER
Caller: FENG    Relationship:     Best call back number: [phone]        NEEDING TO KNOW THE CODES FOR REASONS FOR WHY MONITOR WAS PRESCRIBED    SOMEWHERE AROUND 5.27.24

## 2025-06-25 ENCOUNTER — OFFICE (AMBULATORY)
Dept: URBAN - METROPOLITAN AREA CLINIC 64 | Facility: CLINIC | Age: 65
End: 2025-06-25
Payer: COMMERCIAL

## 2025-06-25 VITALS
HEIGHT: 70 IN | SYSTOLIC BLOOD PRESSURE: 114 MMHG | WEIGHT: 164 LBS | DIASTOLIC BLOOD PRESSURE: 68 MMHG | HEART RATE: 58 BPM

## 2025-06-25 DIAGNOSIS — Z86.0101 PERSONAL HISTORY OF ADENOMATOUS AND SERRATED COLON POLYPS: ICD-10-CM

## 2025-06-25 DIAGNOSIS — K59.00 CONSTIPATION, UNSPECIFIED: ICD-10-CM

## 2025-06-25 DIAGNOSIS — K64.8 OTHER HEMORRHOIDS: ICD-10-CM

## 2025-06-25 PROCEDURE — 99203 OFFICE O/P NEW LOW 30 MIN: CPT | Performed by: NURSE PRACTITIONER

## 2025-06-25 RX ORDER — HYDROCORTISONE 25 MG/G
OINTMENT TOPICAL
Qty: 20 | Refills: 1 | Status: ACTIVE
Start: 2025-06-25